# Patient Record
Sex: MALE | Race: ASIAN | NOT HISPANIC OR LATINO | URBAN - METROPOLITAN AREA
[De-identification: names, ages, dates, MRNs, and addresses within clinical notes are randomized per-mention and may not be internally consistent; named-entity substitution may affect disease eponyms.]

---

## 2023-04-18 ENCOUNTER — EMERGENCY (EMERGENCY)
Facility: HOSPITAL | Age: 53
LOS: 1 days | Discharge: HOME | End: 2023-04-18
Attending: EMERGENCY MEDICINE
Payer: MEDICAID

## 2023-04-18 VITALS
WEIGHT: 134.92 LBS | RESPIRATION RATE: 18 BRPM | HEIGHT: 66 IN | HEART RATE: 90 BPM | OXYGEN SATURATION: 97 % | DIASTOLIC BLOOD PRESSURE: 100 MMHG | SYSTOLIC BLOOD PRESSURE: 171 MMHG | TEMPERATURE: 98 F

## 2023-04-18 PROCEDURE — 99284 EMERGENCY DEPT VISIT MOD MDM: CPT

## 2023-04-18 NOTE — ED ADULT NURSE NOTE - OBJECTIVE STATEMENT
Pt is 52Y m, pmhx DM2, HTN, c/o left thumb pain and swelling for 1 week. Pt states he had splinter in left thumb 25 years ago, pt states he is not aware of splinter ever being removed, pt states left thumb has swelled like this before, required drainage and abx, pt presents to ED with left thumb swollen, tender, and warm to the touch, pt denies any numbness or tingling, pt able to move hand freely, pt denies recent injury to thumb, pt is A&Ox3, ambulatory, pt denies all other symptoms, pt updated on plan of care

## 2023-04-18 NOTE — ED PROVIDER NOTE - CPE EDP EYES NORM
Detail Level: Detailed Quality 431: Preventive Care And Screening: Unhealthy Alcohol Use - Screening: Patient screened for unhealthy alcohol use using a single question and scores less than 2 times per year normal... Quality 130: Documentation Of Current Medications In The Medical Record: Current Medications Documented Quality 226: Preventive Care And Screening: Tobacco Use: Screening And Cessation Intervention: Patient screened for tobacco use and is an ex/non-smoker

## 2023-04-18 NOTE — ED PROVIDER NOTE - PATIENT PORTAL LINK FT
You can access the FollowMyHealth Patient Portal offered by [facility] by registering at the following website: [email or web address]. By joining [employer]’s FollowMyHealth portal, you will also be able to view your health information using other applications (apps) compatible with our system.

## 2023-04-18 NOTE — ED PROVIDER NOTE - PROGRESS NOTE DETAILS
Attending MD Barreto: sp I&D, culture sent, Keflex first dose here, Rx to pharmacy.  Stable for discharge. Follow up instructions given, importance of follow up emphasized, return to ED parameters reviewed and patient verbalized understanding.  All questions answered, all concerns addressed. eJtt Yap MD (PGY-2): Dr. Larios consulted. discussed over phone. I reviewed the case with him and went over the xray while discussing the case with him. Dr. Larios is comfortable w/ ED incising the felon and will follow up w/ the patient. Keflex started per hand surgery recommendations.

## 2023-04-18 NOTE — ED PROVIDER NOTE - NS ED ATTENDING STATEMENT MOD
This was a shared visit with the JOIE. I reviewed and verified the documentation and independently performed the documented:

## 2023-04-18 NOTE — ED PROVIDER NOTE - UPPER EXTREMITY EXAM, LEFT
Pad of left thumb with significant swelling which extends to IP joint of thumb. +tenderness to diffuse pad of thumb w/ very light touch. Area warm to touch, no open wound or drainage. Slight decreased IP flexion of thumb 2/2 swelling. Intact abduction/opposition of thumb. Sensation intact.

## 2023-04-18 NOTE — ED PROVIDER NOTE - NSFOLLOWUPINSTRUCTIONS_ED_ALL_ED_FT
YOU WERE SEEN IN THE ED FOR: pain to your left thumb    WHILE YOU WERE HERE, YOU HAD: an incision and drainage of an abscess of your left thumb (wisam)  YOU WERE PRESCRIBED: Keflex.  You were given the first dose here.  FOLLOW THE INSTRUCTIONS ON THE LABEL/CONTAINER    FOR PAIN, YOU MAY TAKE TYLENOL (ACETAMINOPHEN). FOLLOW THE INSTRUCTIONS ON THE LABEL/CONTAINER.  DO NOT EXCEED 4000MG OF TYLENOL (ACETAMINOPHEN) IN A 24 HOUR PERIOD.    WHILE HERE YOUR BLOOD PRESSURE WAS ELEVATED.  PLEASE FOLLOW UP WITH YOUR PRIVATE PHYSICIAN WITHIN THE NEXT 48 HOURS IN REGARDS TO THIS MATTER.  PLEASE CALL ON 4/19/22 TO DR. CORNEJO'S OFFICE TO SET UP A FOLLOW UP APPOINTMENT. BRING COPIES OF YOUR DISCHARGE INSTRUCTIONS.    RETURN TO THE EMERGENCY DEPARTMENT IF YOU EXPERIENCE ANY NEW/CONCERNING/WORSENING SYMPTOMS SUCH AS BUT NOT LIMITED TO: fevers, chills, increasing pain, redness extending from wound, bleeding that does not stop with holding pressure, decreased range of motion of finger, or any other concerns.

## 2023-04-18 NOTE — ED PROVIDER NOTE - OBJECTIVE STATEMENT
51 yo male PMhx diabetes, HTN presents to the ED complaining of left thumb swelling x1 week.  Reports had splinter in pad of left thumb 25 years ago that was never removed, has always had a "bump" to the pad of the thumb but over the last week has had significant increase in the swelling of the thumb with increasing pain.  Feels the finger is "hot" with associated swelling.  Is unable to touch the area without significant pain.  Denies fever/chills, recent injury, recent trauma, drainage/open wound, numbness/tingling.

## 2023-04-18 NOTE — ED PROVIDER NOTE - CARE PROVIDER_API CALL
Magy Larios (MD)  Plastic Surgery  [address] [place] [address], [address]  [place], [place] [id number]  Phone: [phone]  Fax: [phone]  Follow Up Time: 1-3 Days

## 2023-04-19 VITALS
OXYGEN SATURATION: 100 % | SYSTOLIC BLOOD PRESSURE: 153 MMHG | TEMPERATURE: 98 F | HEART RATE: 92 BPM | RESPIRATION RATE: 17 BRPM | DIASTOLIC BLOOD PRESSURE: 105 MMHG

## 2023-04-19 PROCEDURE — 73140 X-RAY EXAM OF FINGER(S): CPT

## 2023-04-19 PROCEDURE — 87075 CULTR BACTERIA EXCEPT BLOOD: CPT

## 2023-04-19 PROCEDURE — 87070 CULTURE OTHR SPECIMN AEROBIC: CPT

## 2023-04-19 PROCEDURE — 10060 I&D ABSCESS SIMPLE/SINGLE: CPT

## 2023-04-19 PROCEDURE — 73140 X-RAY EXAM OF FINGER(S): CPT | Mod: 26,LT

## 2023-04-19 PROCEDURE — 99283 EMERGENCY DEPT VISIT LOW MDM: CPT | Mod: 25

## 2023-04-19 RX ORDER — CEPHALEXIN 500 MG
1 CAPSULE ORAL
Qty: 14 | Refills: 0
Start: 2023-04-19 | End: 2023-04-25

## 2023-04-19 RX ORDER — CEPHALEXIN 500 MG
500 CAPSULE ORAL ONCE
Refills: 0 | Status: COMPLETED | OUTPATIENT
Start: 2023-04-19 | End: 2023-04-19

## 2023-04-19 RX ADMIN — Medication 500 MILLIGRAM(S): at 01:59

## 2023-04-21 PROBLEM — Z00.00 ENCOUNTER FOR PREVENTIVE HEALTH EXAMINATION: Status: ACTIVE | Noted: 2023-04-21

## 2023-04-24 ENCOUNTER — APPOINTMENT (OUTPATIENT)
Dept: ORTHOPEDIC SURGERY | Facility: CLINIC | Age: 53
End: 2023-04-24
Payer: MEDICAID

## 2023-04-24 PROCEDURE — 10121 INC&RMVL FB SUBQ TISS COMP: CPT

## 2023-04-24 PROCEDURE — 99204 OFFICE O/P NEW MOD 45 MIN: CPT | Mod: 25

## 2023-04-24 PROCEDURE — 10061 I&D ABSCESS COMP/MULTIPLE: CPT

## 2023-04-24 NOTE — ED POST DISCHARGE NOTE - RESULT SUMMARY
4/24/23: I was called by [facility] labs regarding culture of wisam from thumb - mild mold like fungus, sent to mycology. pt DC'ed on abx and referred to hand. as per chart review pt has f/u with ortho/hand today.

## 2023-04-24 NOTE — PROCEDURE
[FreeTextEntry1] : After discussion of treatment options including risks, benefits, possible complications, expectations, and alternatives, verbal consent was obtained for irrigation and debridement of the felon and removal of foreign body of the left thumb. A finger block was administered with 5cc of 1% lidocaine in a sterile fashion. Once appropriate anesthesia was obtained, the finger was prepped and draped in a sterile fashion. The previous incision was reopened A small pocket of pus was encountered.  Cultures were taken at this point. A splinter was removed. A hemostat was used to break up septations and the wound was packed. The area was soaked in betadine and rrigated with a copious amount of saline.  The patient will start three times daily soaks. The patient tolerated the procedure well without complication. Sterile dressing applied.\par

## 2023-04-24 NOTE — PHYSICAL EXAM
[de-identified] : Constitutional: Alert and in no acute distress. \par Psychiatric: Oriented to person, place, and time. Insight and judgement were intact and the affect was normal. \par Cardiovascular: Regular rate assessed through peripheral pulses. \par Pulmonary: Nonlabored breathing on room air. \par Lymphatics: No peripheral lymphadenopathy appreciated.\par \par Musculoskeletal: Left Upper Extremity was seen\par Sealed incision with induration and mild erythema at radial distal thumb\par SILT throughout\par Limited IP flexion secondary to pain\par SILT throughout\par <2s capillary refill [de-identified] : XR L Thumb: 3 views demonstrate no radio-opaque foreign body with decreased swelling from prior

## 2023-04-24 NOTE — ED POST DISCHARGE NOTE - DETAILS
4/24/23:  I called the office of Dr. Ann and spoke to the  who reports the pt is in the office currently.  The  was unable to connect me to a provider but collected the [phone] callback number to forward to a provider. -Paulino Hadley PA-C 4/24/23: I was called back by Dr. Ann who managed the pt in the office today, was able to deliver the splinter, will manage mold like fungus outpatient. -Paulino Hadley PA-C

## 2023-04-24 NOTE — HISTORY OF PRESENT ILLNESS
[FreeTextEntry1] : 52 year-old M presents for evaluation of left thumb.  He reports that initially he got a splinter into the thumb in [place] 25 years prior.  OSH was never able to remove the splinter.  He has been doing more with his hands over the past month and he reports having increasing swelling to the thumb.  Denies new injury or new foreign body.  Went to the ER because of increased pain swelling and erythema.  Here he underwent I&D with negative cultures and no encountered foreign body.  He was placed on cephelexin and referred for follow-up. He has not been soaking at home and the wound has sealed.

## 2023-04-24 NOTE — ASSESSMENT
[FreeTextEntry1] : 52 year-old M s/p removal of foreign body and I&D of a felon\par \par Plan:\par F/u cultures\par Wound check in 1 week\par Three times daily soap water soaks

## 2023-04-26 RX ORDER — AMOXICILLIN AND CLAVULANATE POTASSIUM 500; 125 MG/1; MG/1
500-125 TABLET, FILM COATED ORAL 3 TIMES DAILY
Qty: 21 | Refills: 0 | Status: ACTIVE | COMMUNITY
Start: 2023-04-26 | End: 1900-01-01

## 2023-04-26 RX ORDER — SULFAMETHOXAZOLE AND TRIMETHOPRIM 800; 160 MG/1; MG/1
800-160 TABLET ORAL TWICE DAILY
Qty: 14 | Refills: 0 | Status: ACTIVE | COMMUNITY
Start: 2023-04-26 | End: 1900-01-01

## 2023-05-01 ENCOUNTER — APPOINTMENT (OUTPATIENT)
Dept: ORTHOPEDIC SURGERY | Facility: CLINIC | Age: 53
End: 2023-05-01
Payer: MEDICAID

## 2023-05-01 VITALS
SYSTOLIC BLOOD PRESSURE: 110 MMHG | HEIGHT: 69 IN | WEIGHT: 145 LBS | TEMPERATURE: 97.7 F | BODY MASS INDEX: 21.48 KG/M2 | DIASTOLIC BLOOD PRESSURE: 67 MMHG | HEART RATE: 91 BPM

## 2023-05-01 LAB — BACTERIA WND CULT: ABNORMAL

## 2023-05-01 PROCEDURE — 99213 OFFICE O/P EST LOW 20 MIN: CPT | Mod: 24

## 2023-05-01 RX ORDER — ITRACONAZOLE 100 MG/1
100 CAPSULE, COATED PELLETS ORAL DAILY
Qty: 14 | Refills: 0 | Status: ACTIVE | COMMUNITY
Start: 2023-05-01 | End: 1900-01-01

## 2023-05-01 NOTE — HISTORY OF PRESENT ILLNESS
[FreeTextEntry1] : 52 year-old M presents for evaluation of left thumb. He is doing well on abx.  He understands that cultures from hospital have grown a mold and that I will be prescribing an antifungal.  Otherwise no acute complaints.

## 2023-05-01 NOTE — ASSESSMENT
[FreeTextEntry1] : 52 year-old M s/p removal of foreign body and I&D of a felon\par \par Plan:\par Continue antibiotics\par Start antifungal\par Return to office 1 week for wound check

## 2023-05-01 NOTE — PHYSICAL EXAM
[de-identified] : Constitutional: Alert and in no acute distress. \par Psychiatric: Oriented to person, place, and time. Insight and judgement were intact and the affect was normal. \par Cardiovascular: Regular rate assessed through peripheral pulses. \par Pulmonary: Nonlabored breathing on room air. \par Lymphatics: No peripheral lymphadenopathy appreciated.\par \par Musculoskeletal: Left Upper Extremity was seen\par Prior incision healing without erythema, induration\par SILT throughout\par Full range of motion\par SILT throughout\par <2s capillary refill

## 2023-05-10 ENCOUNTER — APPOINTMENT (OUTPATIENT)
Dept: ORTHOPEDIC SURGERY | Facility: CLINIC | Age: 53
End: 2023-05-10
Payer: MEDICAID

## 2023-05-10 DIAGNOSIS — L03.012 CELLULITIS OF LEFT FINGER: ICD-10-CM

## 2023-05-10 PROCEDURE — 99214 OFFICE O/P EST MOD 30 MIN: CPT

## 2023-05-10 RX ORDER — ITRACONAZOLE 100 MG/1
100 CAPSULE, COATED PELLETS ORAL DAILY
Qty: 14 | Refills: 0 | Status: ACTIVE | COMMUNITY
Start: 2023-05-10 | End: 1900-01-01

## 2023-05-17 ENCOUNTER — APPOINTMENT (OUTPATIENT)
Dept: ORTHOPEDIC SURGERY | Facility: CLINIC | Age: 53
End: 2023-05-17

## 2023-11-10 ENCOUNTER — EMERGENCY (EMERGENCY)
Facility: HOSPITAL | Age: 53
LOS: 1 days | Discharge: HOME | End: 2023-11-10
Attending: STUDENT IN AN ORGANIZED HEALTH CARE EDUCATION/TRAINING PROGRAM | Admitting: STUDENT IN AN ORGANIZED HEALTH CARE EDUCATION/TRAINING PROGRAM
Payer: MEDICAID

## 2023-11-10 VITALS
SYSTOLIC BLOOD PRESSURE: 196 MMHG | RESPIRATION RATE: 18 BRPM | HEART RATE: 84 BPM | OXYGEN SATURATION: 100 % | DIASTOLIC BLOOD PRESSURE: 109 MMHG | TEMPERATURE: 98 F

## 2023-11-10 LAB
A1C WITH ESTIMATED AVERAGE GLUCOSE RESULT: 15.1 % — HIGH (ref 4–5.6)
ALBUMIN SERPL ELPH-MCNC: 4.3 G/DL — SIGNIFICANT CHANGE UP (ref 3.3–5)
ALP SERPL-CCNC: 66 U/L — SIGNIFICANT CHANGE UP (ref 40–120)
ALT FLD-CCNC: 13 U/L — SIGNIFICANT CHANGE UP (ref 4–41)
ANION GAP SERPL CALC-SCNC: 12 MMOL/L — SIGNIFICANT CHANGE UP (ref 7–14)
AST SERPL-CCNC: 11 U/L — SIGNIFICANT CHANGE UP (ref 4–40)
B-OH-BUTYR SERPL-SCNC: <0 MMOL/L — SIGNIFICANT CHANGE UP (ref 0–0.4)
BASE EXCESS BLDV CALC-SCNC: -1.2 MMOL/L — SIGNIFICANT CHANGE UP (ref -2–3)
BASOPHILS # BLD AUTO: 0.03 K/UL — SIGNIFICANT CHANGE UP (ref 0–0.2)
BASOPHILS NFR BLD AUTO: 0.5 % — SIGNIFICANT CHANGE UP (ref 0–2)
BILIRUB SERPL-MCNC: <0.2 MG/DL — SIGNIFICANT CHANGE UP (ref 0.2–1.2)
BLOOD GAS VENOUS COMPREHENSIVE RESULT: SIGNIFICANT CHANGE UP
BUN SERPL-MCNC: 25 MG/DL — HIGH (ref 7–23)
CALCIUM SERPL-MCNC: 9.5 MG/DL — SIGNIFICANT CHANGE UP (ref 8.4–10.5)
CHLORIDE BLDV-SCNC: 96 MMOL/L — SIGNIFICANT CHANGE UP (ref 96–108)
CHLORIDE SERPL-SCNC: 94 MMOL/L — LOW (ref 98–107)
CO2 BLDV-SCNC: 27.3 MMOL/L — HIGH (ref 22–26)
CO2 SERPL-SCNC: 24 MMOL/L — SIGNIFICANT CHANGE UP (ref 22–31)
CREAT SERPL-MCNC: 1.13 MG/DL — SIGNIFICANT CHANGE UP (ref 0.5–1.3)
EGFR: 78 ML/MIN/1.73M2 — SIGNIFICANT CHANGE UP
EOSINOPHIL # BLD AUTO: 0.12 K/UL — SIGNIFICANT CHANGE UP (ref 0–0.5)
EOSINOPHIL NFR BLD AUTO: 2.2 % — SIGNIFICANT CHANGE UP (ref 0–6)
ESTIMATED AVERAGE GLUCOSE: 387 — SIGNIFICANT CHANGE UP
GAS PNL BLDV: 129 MMOL/L — LOW (ref 136–145)
GLUCOSE BLDV-MCNC: 554 MG/DL — CRITICAL HIGH (ref 70–99)
GLUCOSE SERPL-MCNC: 521 MG/DL — CRITICAL HIGH (ref 70–99)
HCO3 BLDV-SCNC: 26 MMOL/L — SIGNIFICANT CHANGE UP (ref 22–29)
HCT VFR BLD CALC: 38.4 % — LOW (ref 39–50)
HCT VFR BLDA CALC: 39 % — SIGNIFICANT CHANGE UP (ref 39–51)
HGB BLD CALC-MCNC: 13.1 G/DL — SIGNIFICANT CHANGE UP (ref 12.6–17.4)
HGB BLD-MCNC: 12.9 G/DL — LOW (ref 13–17)
IANC: 2.25 K/UL — SIGNIFICANT CHANGE UP (ref 1.8–7.4)
IMM GRANULOCYTES NFR BLD AUTO: 0.2 % — SIGNIFICANT CHANGE UP (ref 0–0.9)
LACTATE BLDV-MCNC: 1.3 MMOL/L — SIGNIFICANT CHANGE UP (ref 0.5–2)
LIDOCAIN IGE QN: 94 U/L — HIGH (ref 7–60)
LYMPHOCYTES # BLD AUTO: 2.83 K/UL — SIGNIFICANT CHANGE UP (ref 1–3.3)
LYMPHOCYTES # BLD AUTO: 51.2 % — HIGH (ref 13–44)
MAGNESIUM SERPL-MCNC: 2 MG/DL — SIGNIFICANT CHANGE UP (ref 1.6–2.6)
MCHC RBC-ENTMCNC: 28.2 PG — SIGNIFICANT CHANGE UP (ref 27–34)
MCHC RBC-ENTMCNC: 33.6 GM/DL — SIGNIFICANT CHANGE UP (ref 32–36)
MCV RBC AUTO: 83.8 FL — SIGNIFICANT CHANGE UP (ref 80–100)
MONOCYTES # BLD AUTO: 0.29 K/UL — SIGNIFICANT CHANGE UP (ref 0–0.9)
MONOCYTES NFR BLD AUTO: 5.2 % — SIGNIFICANT CHANGE UP (ref 2–14)
NEUTROPHILS # BLD AUTO: 2.25 K/UL — SIGNIFICANT CHANGE UP (ref 1.8–7.4)
NEUTROPHILS NFR BLD AUTO: 40.7 % — LOW (ref 43–77)
NRBC # BLD: 0 /100 WBCS — SIGNIFICANT CHANGE UP (ref 0–0)
NRBC # FLD: 0 K/UL — SIGNIFICANT CHANGE UP (ref 0–0)
PCO2 BLDV: 51 MMHG — SIGNIFICANT CHANGE UP (ref 42–55)
PH BLDV: 7.31 — LOW (ref 7.32–7.43)
PHOSPHATE SERPL-MCNC: 4 MG/DL — SIGNIFICANT CHANGE UP (ref 2.5–4.5)
PLATELET # BLD AUTO: 209 K/UL — SIGNIFICANT CHANGE UP (ref 150–400)
PO2 BLDV: 40 MMHG — SIGNIFICANT CHANGE UP (ref 25–45)
POTASSIUM BLDV-SCNC: 4.7 MMOL/L — SIGNIFICANT CHANGE UP (ref 3.5–5.1)
POTASSIUM SERPL-MCNC: 4.7 MMOL/L — SIGNIFICANT CHANGE UP (ref 3.5–5.3)
POTASSIUM SERPL-SCNC: 4.7 MMOL/L — SIGNIFICANT CHANGE UP (ref 3.5–5.3)
PROT SERPL-MCNC: 7.6 G/DL — SIGNIFICANT CHANGE UP (ref 6–8.3)
RBC # BLD: 4.58 M/UL — SIGNIFICANT CHANGE UP (ref 4.2–5.8)
RBC # FLD: 11.8 % — SIGNIFICANT CHANGE UP (ref 10.3–14.5)
SAO2 % BLDV: 45.6 % — LOW (ref 67–88)
SODIUM SERPL-SCNC: 130 MMOL/L — LOW (ref 135–145)
TSH SERPL-MCNC: 1.82 UIU/ML — SIGNIFICANT CHANGE UP (ref 0.27–4.2)
WBC # BLD: 5.53 K/UL — SIGNIFICANT CHANGE UP (ref 3.8–10.5)
WBC # FLD AUTO: 5.53 K/UL — SIGNIFICANT CHANGE UP (ref 3.8–10.5)

## 2023-11-10 PROCEDURE — 99284 EMERGENCY DEPT VISIT MOD MDM: CPT

## 2023-11-10 PROCEDURE — 71046 X-RAY EXAM CHEST 2 VIEWS: CPT | Mod: 26

## 2023-11-10 PROCEDURE — 93010 ELECTROCARDIOGRAM REPORT: CPT

## 2023-11-10 RX ORDER — SODIUM CHLORIDE 9 MG/ML
1000 INJECTION INTRAMUSCULAR; INTRAVENOUS; SUBCUTANEOUS ONCE
Refills: 0 | Status: COMPLETED | OUTPATIENT
Start: 2023-11-10 | End: 2023-11-10

## 2023-11-10 RX ORDER — AMLODIPINE BESYLATE 2.5 MG/1
5 TABLET ORAL ONCE
Refills: 0 | Status: COMPLETED | OUTPATIENT
Start: 2023-11-10 | End: 2023-11-10

## 2023-11-10 RX ORDER — SODIUM CHLORIDE 9 MG/ML
1000 INJECTION, SOLUTION INTRAVENOUS ONCE
Refills: 0 | Status: COMPLETED | OUTPATIENT
Start: 2023-11-10 | End: 2023-11-10

## 2023-11-10 RX ADMIN — SODIUM CHLORIDE 1000 MILLILITER(S): 9 INJECTION, SOLUTION INTRAVENOUS at 21:53

## 2023-11-10 RX ADMIN — SODIUM CHLORIDE 1000 MILLILITER(S): 9 INJECTION INTRAMUSCULAR; INTRAVENOUS; SUBCUTANEOUS at 22:00

## 2023-11-10 NOTE — ED ADULT TRIAGE NOTE - CODE STROKE ACTIVE YN
No
Patient will benefit from Swallow Therapy pending discharge plan (rehabilitation center vs home care vs outpatient vs [facility] [phone])

## 2023-11-10 NOTE — ED PROVIDER NOTE - WR ORDER NAME 1
Medication refill authorized.
Medication refill request:    Last Office Visit:4/17/18  Next Office Visit:  No future appointments. Pharmacy verified. Yes    Patient need's a new prescription sent to the [facility] there # [phone].
Xray Chest 2 Views PA/Lat

## 2023-11-10 NOTE — ED PROVIDER NOTE - ATTENDING CONTRIBUTION TO CARE
54 yo male with PMH DM and HTN for evaluation of blurry vision. [place] from [facility], does not have primary medical doctor. Was sent to ED by ophthalmology for concerning physical exam PE: Well appearing, RRR, CTA BL lungs, abd soft NTND A/P Labs, imaging, medicate, reassess 52 yo male with PMH DM and HTN for evaluation of blurry vision. [place] from [facility], does not have primary medical doctor. Was sent to ED by ophthalmology for concerning physical exam PE: Well appearing, RRR, CTA BL lungs, abd soft NTND A/P Labs, imaging, medicate, reassess 52 yo male with PMH DM and HTN for evaluation of blurry vision. [place] [facility], does not have primary medical doctor. Was sent to ED by ophthalmology for concerning physical exam PE: Well appearing, RRR, CTA BL lungs, abd soft NTND A/P Labs, imaging, medicate, reassess

## 2023-11-10 NOTE — ED PROVIDER NOTE - PHYSICAL EXAMINATION
General: Alert and Orientated x 3. No apparent distress.  Head: Normocephalic and atraumatic.  Eyes: PERRLA with EOMI.  Neck: Supple. Trachea midline.   Cardiac: Normal S1 and S2 w/ RRR. No murmurs appreciated.   Pulmonary: CTA bilaterally. No increased WOB. No wheezes or crackles.  Abdominal: Soft, non-tender. (+) bowel sounds appreciated in all 4 quadrants. No hepatosplenomegaly.   Neurologic: No focal sensory or motor deficits.  Musculoskeletal: Strength appropriate in all 4 extremities for age with no limited ROM.  Skin: Color appropriate for race. Intact, warm, and well-perfused.  Psychiatric: Appropriate mood and affect. No apparent risk to self or others.

## 2023-11-10 NOTE — ED ADULT NURSE NOTE - NSFALLUNIVINTERV_ED_ALL_ED
Bed/Stretcher in lowest position, wheels locked, appropriate side rails in place/Call bell, personal items and telephone in reach/Instruct patient to call for assistance before getting out of bed/chair/stretcher/Non-slip footwear applied when patient is off stretcher/[place] to call system/Physically safe environment - no spills, clutter or unnecessary equipment/Purposeful proactive rounding/Room/bathroom lighting operational, light cord in reach Bed/Stretcher in lowest position, wheels locked, appropriate side rails in place/Call bell, personal items and telephone in reach/Instruct patient to call for assistance before getting out of bed/chair/stretcher/Non-slip footwear applied when patient is off stretcher/[place] to call system/Physically safe environment - no spills, clutter or unnecessary equipment/Purposeful proactive rounding/Room/bathroom lighting operational, light cord in reach Bed/Stretcher in lowest position, wheels locked, appropriate side rails in place/Call bell, personal items and telephone in reach/Instruct patient to call for assistance before getting out of bed/chair/stretcher/Non-slip footwear applied when patient is off stretcher/[place] to call system/Physically safe environment - no spills, clutter or unnecessary equipment/Purposeful proactive rounding/Room/bathroom lighting operational, light cord in reach

## 2023-11-10 NOTE — ED PROVIDER NOTE - OBJECTIVE STATEMENT
Patient Is a 52 y/o M with hx of DM and HTN PTED with blurry vision. Patient moved from [facility] to the  9 mos ago. Has not taken any oral dm or htn beds. previously on metformin and catapril. Has had micu admissions in [facility] for dka. Never on insulin at home. No PMD f/u here. Went to ophthalmologist and was referred to ED for "blood in eye". Paperwork from physician showed c/f diabetic retinopathy and cotton wool spots. No detachments. Normal pressures. VA 20/60 OD and OS. No fever. chills, nausea, vomiting, urinary or bowel changes. Patient Is a 54 y/o M with hx of DM and HTN PTED with blurry vision. Patient moved from [facility] to the  9 mos ago. Has not taken any oral dm or htn beds. previously on metformin and catapril. Has had micu admissions in [facility] for dka. Never on insulin at home. No PMD f/u here. Went to ophthalmologist and was referred to ED for "blood in eye". Paperwork from physician showed c/f diabetic retinopathy and cotton wool spots. No detachments. Normal pressures. VA 20/60 OD and OS. No fever. chills, nausea, vomiting, urinary or bowel changes. Patient Is a 52 y/o M with hx of DM and HTN PTED with blurry vision. Patient moved from [facility] to the  9 mos ago. Has not taken any oral dm or htn beds. previously on metformin and catapril. Has had micu admissions in [facility] for dka. Never on insulin at home. No PMD f/u here. Went to ophthalmologist and was referred to ED for "blood in eye". Paperwork from physician showed c/f diabetic retinopathy and cotton wool spots. No detachments. Normal pressures. VA 20/60 OD and OS. No fever. chills, nausea, vomiting, urinary or bowel changes.

## 2023-11-10 NOTE — ED ADULT NURSE NOTE - OBJECTIVE STATEMENT
Received patient to 3a for blurry vision. A&o4, ambulatory, pt states noticed BL blurry x 2 weeks, went to eye doctor and was told he is "bleeding behind eyes".  No redness noted. Pt states also has had elevated BP and BGL. Hypertensive at this time, BGL >500. Denies any fall/trauma, chest pain, SOB, headache.  RR even and unlabored, pending MD trevizo

## 2023-11-10 NOTE — ED ADULT TRIAGE NOTE - CHIEF COMPLAINT QUOTE
Sent by ophthalmologist for bleeding behind the eyes. States he's been having blurry vision, headache, dizziness and eye pain. Pt. hypertensive and hyperglycemic (500s) in triage. States he hasn't taken his BP or diabetes medication in 9 months.

## 2023-11-10 NOTE — ED PROVIDER NOTE - CLINICAL SUMMARY MEDICAL DECISION MAKING FREE TEXT BOX
Patient Is a 52 y/o M with hx of DM and HTN PTED with blurry vision. Patient moved from [facility] to the  9 mos ago. Has not taken any oral dm or htn beds. previously on metformin and catapril. Has had micu admissions in [facility] for dka. Never on insulin at home. No PMD f/u here. Went to ophthalmologist and was referred to ED for "blood in eye". Paperwork from physician showed c/f diabetic retinopathy and cotton wool spots. No detachments. Normal pressures. VA 20/60 OD and OS. No fever. chills, nausea, vomiting, urinary or bowel changes.    Slightly hypertensive in ED, otherwise hemodynamically stable. FS ~500s. will get dka labs. patient had full w/u with ophtho and touched base with our ophtho and no additional w/u necessary. if not in dka, will consider cdu for hyperglycemia and endo management. Patient Is a 54 y/o M with hx of DM and HTN PTED with blurry vision. Patient moved from [facility] to the  9 mos ago. Has not taken any oral dm or htn beds. previously on metformin and catapril. Has had micu admissions in [facility] for dka. Never on insulin at home. No PMD f/u here. Went to ophthalmologist and was referred to ED for "blood in eye". Paperwork from physician showed c/f diabetic retinopathy and cotton wool spots. No detachments. Normal pressures. VA 20/60 OD and OS. No fever. chills, nausea, vomiting, urinary or bowel changes.    Slightly hypertensive in ED, otherwise hemodynamically stable. FS ~500s. will get dka labs. patient had full w/u with ophtho and touched base with our ophtho and no additional w/u necessary. if not in dka, will consider cdu for hyperglycemia and endo management. Patient Is a 52 y/o M with hx of DM and HTN PTED with blurry vision. Patient moved from [facility] to the  9 mos ago. Has not taken any oral dm or htn beds. previously on metformin and catapril. Has had micu admissions in [facility] for dka. Never on insulin at home. No PMD f/u here. Went to ophthalmologist and was referred to ED for "blood in eye". Paperwork from physician showed c/f diabetic retinopathy and cotton wool spots. No detachments. Normal pressures. VA 20/60 OD and OS. No fever. chills, nausea, vomiting, urinary or bowel changes.    Slightly hypertensive in ED, otherwise hemodynamically stable. FS ~500s. will get dka labs. patient had full w/u with ophtho and touched base with our ophtho and no additional w/u necessary. if not in dka, will consider cdu for hyperglycemia and endo management.

## 2023-11-11 VITALS
SYSTOLIC BLOOD PRESSURE: 142 MMHG | TEMPERATURE: 98 F | OXYGEN SATURATION: 100 % | RESPIRATION RATE: 16 BRPM | DIASTOLIC BLOOD PRESSURE: 98 MMHG | HEART RATE: 84 BPM

## 2023-11-11 DIAGNOSIS — E11.00 TYPE 2 DIABETES MELLITUS WITH HYPEROSMOLARITY WITHOUT NONKETOTIC HYPERGLYCEMIC-HYPEROSMOLAR COMA (NKHHC): ICD-10-CM

## 2023-11-11 DIAGNOSIS — E78.5 HYPERLIPIDEMIA, UNSPECIFIED: ICD-10-CM

## 2023-11-11 DIAGNOSIS — E11.9 TYPE 2 DIABETES MELLITUS WITHOUT COMPLICATIONS: ICD-10-CM

## 2023-11-11 LAB
ALBUMIN SERPL ELPH-MCNC: 3.7 G/DL — SIGNIFICANT CHANGE UP (ref 3.3–5)
ALP SERPL-CCNC: 59 U/L — SIGNIFICANT CHANGE UP (ref 40–120)
ALT FLD-CCNC: 10 U/L — SIGNIFICANT CHANGE UP (ref 4–41)
ANION GAP SERPL CALC-SCNC: 11 MMOL/L — SIGNIFICANT CHANGE UP (ref 7–14)
APPEARANCE UR: CLEAR — SIGNIFICANT CHANGE UP
AST SERPL-CCNC: 14 U/L — SIGNIFICANT CHANGE UP (ref 4–40)
BASE EXCESS BLDV CALC-SCNC: -0.6 MMOL/L — SIGNIFICANT CHANGE UP (ref -2–3)
BASOPHILS # BLD AUTO: 0.03 K/UL — SIGNIFICANT CHANGE UP (ref 0–0.2)
BASOPHILS NFR BLD AUTO: 0.6 % — SIGNIFICANT CHANGE UP (ref 0–2)
BILIRUB SERPL-MCNC: 0.4 MG/DL — SIGNIFICANT CHANGE UP (ref 0.2–1.2)
BILIRUB UR-MCNC: NEGATIVE — SIGNIFICANT CHANGE UP
BLOOD GAS VENOUS COMPREHENSIVE RESULT: SIGNIFICANT CHANGE UP
BUN SERPL-MCNC: 16 MG/DL — SIGNIFICANT CHANGE UP (ref 7–23)
CALCIUM SERPL-MCNC: 9 MG/DL — SIGNIFICANT CHANGE UP (ref 8.4–10.5)
CHLORIDE BLDV-SCNC: 102 MMOL/L — SIGNIFICANT CHANGE UP (ref 96–108)
CHLORIDE SERPL-SCNC: 100 MMOL/L — SIGNIFICANT CHANGE UP (ref 98–107)
CHOLEST SERPL-MCNC: 150 MG/DL — SIGNIFICANT CHANGE UP
CO2 BLDV-SCNC: 26.2 MMOL/L — HIGH (ref 22–26)
CO2 SERPL-SCNC: 23 MMOL/L — SIGNIFICANT CHANGE UP (ref 22–31)
COLOR SPEC: YELLOW — SIGNIFICANT CHANGE UP
CREAT SERPL-MCNC: 0.96 MG/DL — SIGNIFICANT CHANGE UP (ref 0.5–1.3)
DIFF PNL FLD: NEGATIVE — SIGNIFICANT CHANGE UP
EGFR: 95 ML/MIN/1.73M2 — SIGNIFICANT CHANGE UP
EOSINOPHIL # BLD AUTO: 0.13 K/UL — SIGNIFICANT CHANGE UP (ref 0–0.5)
EOSINOPHIL NFR BLD AUTO: 2.4 % — SIGNIFICANT CHANGE UP (ref 0–6)
GAS PNL BLDV: 132 MMOL/L — LOW (ref 136–145)
GLUCOSE BLDV-MCNC: 297 MG/DL — HIGH (ref 70–99)
GLUCOSE SERPL-MCNC: 278 MG/DL — HIGH (ref 70–99)
GLUCOSE UR QL: >=1000 MG/DL
HCO3 BLDV-SCNC: 25 MMOL/L — SIGNIFICANT CHANGE UP (ref 22–29)
HCT VFR BLD CALC: 39.3 % — SIGNIFICANT CHANGE UP (ref 39–50)
HCT VFR BLDA CALC: 41 % — SIGNIFICANT CHANGE UP (ref 39–51)
HDLC SERPL-MCNC: 22 MG/DL — LOW
HGB BLD CALC-MCNC: 13.5 G/DL — SIGNIFICANT CHANGE UP (ref 12.6–17.4)
HGB BLD-MCNC: 13.2 G/DL — SIGNIFICANT CHANGE UP (ref 13–17)
IANC: 2.18 K/UL — SIGNIFICANT CHANGE UP (ref 1.8–7.4)
IMM GRANULOCYTES NFR BLD AUTO: 0.6 % — SIGNIFICANT CHANGE UP (ref 0–0.9)
KETONES UR-MCNC: NEGATIVE MG/DL — SIGNIFICANT CHANGE UP
LACTATE BLDV-MCNC: 1.3 MMOL/L — SIGNIFICANT CHANGE UP (ref 0.5–2)
LEUKOCYTE ESTERASE UR-ACNC: NEGATIVE — SIGNIFICANT CHANGE UP
LIDOCAIN IGE QN: 73 U/L — HIGH (ref 7–60)
LIPID PNL WITH DIRECT LDL SERPL: 96 MG/DL — SIGNIFICANT CHANGE UP
LYMPHOCYTES # BLD AUTO: 2.71 K/UL — SIGNIFICANT CHANGE UP (ref 1–3.3)
LYMPHOCYTES # BLD AUTO: 50.5 % — HIGH (ref 13–44)
MCHC RBC-ENTMCNC: 28.6 PG — SIGNIFICANT CHANGE UP (ref 27–34)
MCHC RBC-ENTMCNC: 33.6 GM/DL — SIGNIFICANT CHANGE UP (ref 32–36)
MCV RBC AUTO: 85.1 FL — SIGNIFICANT CHANGE UP (ref 80–100)
MONOCYTES # BLD AUTO: 0.29 K/UL — SIGNIFICANT CHANGE UP (ref 0–0.9)
MONOCYTES NFR BLD AUTO: 5.4 % — SIGNIFICANT CHANGE UP (ref 2–14)
NEUTROPHILS # BLD AUTO: 2.18 K/UL — SIGNIFICANT CHANGE UP (ref 1.8–7.4)
NEUTROPHILS NFR BLD AUTO: 40.5 % — LOW (ref 43–77)
NITRITE UR-MCNC: NEGATIVE — SIGNIFICANT CHANGE UP
NON HDL CHOLESTEROL: 128 MG/DL — SIGNIFICANT CHANGE UP
NRBC # BLD: 0 /100 WBCS — SIGNIFICANT CHANGE UP (ref 0–0)
NRBC # FLD: 0 K/UL — SIGNIFICANT CHANGE UP (ref 0–0)
PCO2 BLDV: 43 MMHG — SIGNIFICANT CHANGE UP (ref 42–55)
PH BLDV: 7.37 — SIGNIFICANT CHANGE UP (ref 7.32–7.43)
PH UR: 6 — SIGNIFICANT CHANGE UP (ref 5–8)
PLATELET # BLD AUTO: 201 K/UL — SIGNIFICANT CHANGE UP (ref 150–400)
PO2 BLDV: 67 MMHG — HIGH (ref 25–45)
POTASSIUM BLDV-SCNC: 4.4 MMOL/L — SIGNIFICANT CHANGE UP (ref 3.5–5.1)
POTASSIUM SERPL-MCNC: 4.2 MMOL/L — SIGNIFICANT CHANGE UP (ref 3.5–5.3)
POTASSIUM SERPL-SCNC: 4.2 MMOL/L — SIGNIFICANT CHANGE UP (ref 3.5–5.3)
PROT SERPL-MCNC: 7 G/DL — SIGNIFICANT CHANGE UP (ref 6–8.3)
PROT UR-MCNC: NEGATIVE MG/DL — SIGNIFICANT CHANGE UP
RBC # BLD: 4.62 M/UL — SIGNIFICANT CHANGE UP (ref 4.2–5.8)
RBC # FLD: 11.8 % — SIGNIFICANT CHANGE UP (ref 10.3–14.5)
SAO2 % BLDV: 93.6 % — HIGH (ref 67–88)
SODIUM SERPL-SCNC: 134 MMOL/L — LOW (ref 135–145)
SP GR SPEC: 1.02 — SIGNIFICANT CHANGE UP (ref 1–1.03)
TRIGL SERPL-MCNC: 161 MG/DL — HIGH
UROBILINOGEN FLD QL: 0.2 MG/DL — SIGNIFICANT CHANGE UP (ref 0.2–1)
WBC # BLD: 5.37 K/UL — SIGNIFICANT CHANGE UP (ref 3.8–10.5)
WBC # FLD AUTO: 5.37 K/UL — SIGNIFICANT CHANGE UP (ref 3.8–10.5)

## 2023-11-11 PROCEDURE — 99236 HOSP IP/OBS SAME DATE HI 85: CPT

## 2023-11-11 PROCEDURE — 99283 EMERGENCY DEPT VISIT LOW MDM: CPT

## 2023-11-11 RX ORDER — INSULIN LISPRO 100/ML
4 VIAL (ML) SUBCUTANEOUS
Refills: 0 | Status: DISCONTINUED | OUTPATIENT
Start: 2023-11-11 | End: 2023-11-14

## 2023-11-11 RX ORDER — DEXTROSE 50 % IN WATER 50 %
12.5 SYRINGE (ML) INTRAVENOUS ONCE
Refills: 0 | Status: DISCONTINUED | OUTPATIENT
Start: 2023-11-11 | End: 2023-11-14

## 2023-11-11 RX ORDER — INSULIN LISPRO 100/ML
4 VIAL (ML) SUBCUTANEOUS
Qty: 1 | Refills: 0
Start: 2023-11-11 | End: 2023-12-10

## 2023-11-11 RX ORDER — SODIUM CHLORIDE 9 MG/ML
1000 INJECTION, SOLUTION INTRAVENOUS
Refills: 0 | Status: DISCONTINUED | OUTPATIENT
Start: 2023-11-11 | End: 2023-11-14

## 2023-11-11 RX ORDER — INSULIN GLARGINE 100 [IU]/ML
14 INJECTION, SOLUTION SUBCUTANEOUS
Qty: 1 | Refills: 0
Start: 2023-11-11

## 2023-11-11 RX ORDER — INSULIN LISPRO 100/ML
VIAL (ML) SUBCUTANEOUS
Refills: 0 | Status: DISCONTINUED | OUTPATIENT
Start: 2023-11-11 | End: 2023-11-14

## 2023-11-11 RX ORDER — DEXTROSE 50 % IN WATER 50 %
25 SYRINGE (ML) INTRAVENOUS ONCE
Refills: 0 | Status: DISCONTINUED | OUTPATIENT
Start: 2023-11-11 | End: 2023-11-14

## 2023-11-11 RX ORDER — INSULIN GLARGINE 100 [IU]/ML
14 INJECTION, SOLUTION SUBCUTANEOUS ONCE
Refills: 0 | Status: COMPLETED | OUTPATIENT
Start: 2023-11-11 | End: 2023-11-11

## 2023-11-11 RX ORDER — INSULIN LISPRO 100/ML
VIAL (ML) SUBCUTANEOUS AT BEDTIME
Refills: 0 | Status: DISCONTINUED | OUTPATIENT
Start: 2023-11-11 | End: 2023-11-14

## 2023-11-11 RX ORDER — AMLODIPINE BESYLATE 2.5 MG/1
1 TABLET ORAL
Qty: 30 | Refills: 0
Start: 2023-11-11 | End: 2023-12-10

## 2023-11-11 RX ORDER — METFORMIN HYDROCHLORIDE 850 MG/1
1 TABLET ORAL
Qty: 60 | Refills: 0
Start: 2023-11-11

## 2023-11-11 RX ORDER — DEXTROSE 50 % IN WATER 50 %
15 SYRINGE (ML) INTRAVENOUS ONCE
Refills: 0 | Status: DISCONTINUED | OUTPATIENT
Start: 2023-11-11 | End: 2023-11-14

## 2023-11-11 RX ORDER — GLUCAGON INJECTION, SOLUTION 0.5 MG/.1ML
1 INJECTION, SOLUTION SUBCUTANEOUS ONCE
Refills: 0 | Status: DISCONTINUED | OUTPATIENT
Start: 2023-11-11 | End: 2023-11-14

## 2023-11-11 RX ADMIN — Medication 4 UNIT(S): at 16:47

## 2023-11-11 RX ADMIN — Medication 4 UNIT(S): at 11:38

## 2023-11-11 RX ADMIN — AMLODIPINE BESYLATE 5 MILLIGRAM(S): 2.5 TABLET ORAL at 00:38

## 2023-11-11 RX ADMIN — Medication 1: at 16:47

## 2023-11-11 RX ADMIN — Medication 2: at 07:48

## 2023-11-11 RX ADMIN — SODIUM CHLORIDE 1000 MILLILITER(S): 9 INJECTION, SOLUTION INTRAVENOUS at 00:35

## 2023-11-11 RX ADMIN — INSULIN GLARGINE 14 UNIT(S): 100 INJECTION, SOLUTION SUBCUTANEOUS at 03:38

## 2023-11-11 RX ADMIN — Medication 2: at 11:39

## 2023-11-11 RX ADMIN — Medication 4 UNIT(S): at 07:47

## 2023-11-11 NOTE — ED CDU PROVIDER DISPOSITION NOTE - CLINICAL COURSE
54 y/o M with hx of DM and HTN PTED with blurry vision. Patient moved from [facility] to the  9 mos ago. Has not taken any oral dm or htn beds. previously on metformin and catapril. Has had micu admissions in [facility] for dka. Never on insulin at home. No PMD f/u here. Went to ophthalmologist and was referred to ED for "blood in eye". Paperwork from physician showed c/f diabetic retinopathy and cotton wool spots. No detachments. Normal pressures. VA 20/60 OD and OS. No fever. chills, nausea, vomiting, urinary or bowel changes.    	CDU BLACK Arciniega Note----  	ED Provider HPI as above, reviewed.  Pt is a 54 yo male, PMH HTN, DM, with past hx/o DKA in [facility], but was reportedly never Rx'd insulin, was only on metformin.  Since moving from [facility] ~9 months ago, pt has not been on any medication; has not arranged a PMD since moving here.  Pt went to an outside ophthalmologist for c/o blurry vision, had findings c/w diabetic retinopathy, as above.  In the ED, pt afebrile, Initial /109; VS otherwise stable.  BP downtrended to 172/109, then 141/89; amlodipine 5 milligrams dose was then ordered in ED.  WBC 5.53, Hb 12.9, Hct 38.4, platelets 209.  CMP: sodium 130, anion gap 12, BUN 25, creatinine 1.13, glucose 521.  Lipase 94.  A1c 15.1, beta hydroxy butyrate <0.0.  VBG: pH 7.31, lactate 1.3.  TSH 1.82.  CXR NAPF. Pt was given 3 liters IV fluids; pt was sent to CDU for continued care plan:  Hyperglycemia management, glucose monitoring, AM labs, Endocrine consultation, diabetic education, supportive care, general observation care / monitoring. Endo team consulted patient. Recs appreciated. Rx's sent to pharmacy. DM education provided. Patient to follow up in Endo clinic. Worsening, continued or ANY new concerning symptoms return to the emergency department. 54 y/o M with hx of DM and HTN PTED with blurry vision. Patient moved from [facility] to the  9 mos ago. Has not taken any oral dm or htn beds. previously on metformin and catapril. Has had micu admissions in [facility] for dka. Never on insulin at home. No PMD f/u here. Went to ophthalmologist and was referred to ED for "blood in eye". Paperwork from physician showed c/f diabetic retinopathy and cotton wool spots. No detachments. Normal pressures. VA 20/60 OD and OS. No fever. chills, nausea, vomiting, urinary or bowel changes.    	CDU BLACK Arciniega Note----  	ED Provider HPI as above, reviewed.  Pt is a 54 yo male, PMH HTN, DM, with past hx/o DKA in [facility], but was reportedly never Rx'd insulin, was only on metformin.  Since moving from [facility] ~9 months ago, pt has not been on any medication; has not arranged a PMD since moving here.  Pt went to an outside ophthalmologist for c/o blurry vision, had findings c/w diabetic retinopathy, as above.  In the ED, pt afebrile, Initial /109; VS otherwise stable.  BP downtrended to 172/109, then 141/89; amlodipine 5 milligrams dose was then ordered in ED.  WBC 5.53, Hb 12.9, Hct 38.4, platelets 209.  CMP: sodium 130, anion gap 12, BUN 25, creatinine 1.13, glucose 521.  Lipase 94.  A1c 15.1, beta hydroxy butyrate <0.0.  VBG: pH 7.31, lactate 1.3.  TSH 1.82.  CXR NAPF. Pt was given 3 liters IV fluids; pt was sent to CDU for continued care plan:  Hyperglycemia management, glucose monitoring, AM labs, Endocrine consultation, diabetic education, supportive care, general observation care / monitoring. Endo team consulted patient. Recs appreciated. Rx's sent to pharmacy. DM education provided. Patient to follow up in Endo clinic. Worsening, continued or ANY new concerning symptoms return to the emergency department. 52 y/o M with hx of DM and HTN PTED with blurry vision. Patient moved from [facility] to the  9 mos ago. Has not taken any oral dm or htn beds. previously on metformin and catapril. Has had micu admissions in [facility] for dka. Never on insulin at home. No PMD f/u here. Went to ophthalmologist and was referred to ED for "blood in eye". Paperwork from physician showed c/f diabetic retinopathy and cotton wool spots. No detachments. Normal pressures. VA 20/60 OD and OS. No fever. chills, nausea, vomiting, urinary or bowel changes.    	CDU BLACK Arciniega Note----  	ED Provider HPI as above, reviewed.  Pt is a 52 yo male, PMH HTN, DM, with past hx/o DKA in [facility], but was reportedly never Rx'd insulin, was only on metformin.  Since moving from [facility] ~9 months ago, pt has not been on any medication; has not arranged a PMD since moving here.  Pt went to an outside ophthalmologist for c/o blurry vision, had findings c/w diabetic retinopathy, as above.  In the ED, pt afebrile, Initial /109; VS otherwise stable.  BP downtrended to 172/109, then 141/89; amlodipine 5 milligrams dose was then ordered in ED.  WBC 5.53, Hb 12.9, Hct 38.4, platelets 209.  CMP: sodium 130, anion gap 12, BUN 25, creatinine 1.13, glucose 521.  Lipase 94.  A1c 15.1, beta hydroxy butyrate <0.0.  VBG: pH 7.31, lactate 1.3.  TSH 1.82.  CXR NAPF. Pt was given 3 liters IV fluids; pt was sent to CDU for continued care plan:  Hyperglycemia management, glucose monitoring, AM labs, Endocrine consultation, diabetic education, supportive care, general observation care / monitoring. Endo team consulted patient. Recs appreciated. Rx's sent to pharmacy. DM education provided. Patient to follow up in Endo clinic. Worsening, continued or ANY new concerning symptoms return to the emergency department.

## 2023-11-11 NOTE — ED CDU PROVIDER DISPOSITION NOTE - NSFOLLOWUPINSTRUCTIONS_ED_ALL_ED_FT
Follow up in our Endo clinic within the week. Call to make an appointment:   [facility]  [address] [place], [place] [address] [phone]    High Blood Pressure:  Start Amlodipine 5mg 1 tab daily    Diabetes:  Test your blood sugar 3x/day before meals and at bedtime and record in a log book- if less than 50 or greater than 300 return to the ER  Take Metformin 500mg 1 tab 2x/day for 7 days then increase to 1000mg 2x/day  Inject Lantus 14 units at bedtime  Inject Admelog 4 units before meals 3 times per day  Decrease your sugar and carbohydrate intake in your diet  Increase your exercise     Worsening, continued or ANY new concerning symptoms return to the emergency department. Follow up in our Endo clinic within the week. Call to make an appointment:   [facility]  [address] [place], [place] [address] [phone]    High Blood Pressure:  Start Amlodipine 5mg 1 tab daily    Diabetes:  Test your blood sugar 3x/day before meals and at bedtime and record in a log book- if less than 50 or greater than 300 return to the ER  Take Metformin 500mg 1 tab 2x/day for 7 days then increase to 1000mg 2x/day  Inject Lantus 14 units at bedtime  Inject Admelog 4 units before meals 3 times per day  Decrease your sugar and carbohydrate intake in your diet  Increase your exercise     Worsening, continued or ANY new concerning symptoms return to the emergency department. Follow up in our Endo clinic within the week. Call to make an appointment:   [facility]  [address] [place], [place] [id number] [phone]    High Blood Pressure:  Start Amlodipine 5mg 1 tab daily    Diabetes:  Test your blood sugar 3x/day before meals and at bedtime and record in a log book- if less than 50 or greater than 300 return to the ER  Take Metformin 500mg 1 tab 2x/day for 7 days then increase to 1000mg 2x/day  Inject Lantus 14 units at bedtime  Inject Admelog 4 units before meals 3 times per day  Decrease your sugar and carbohydrate intake in your diet  Increase your exercise     Worsening, continued or ANY new concerning symptoms return to the emergency department.

## 2023-11-11 NOTE — ED CDU PROVIDER DISPOSITION NOTE - ATTENDING CONTRIBUTION TO CARE
I have evaluated the patient and agree with the documentation and assessment as made by the JOIE. We have discussed plan of care and work up for the patient.

## 2023-11-11 NOTE — CONSULT NOTE ADULT - ASSESSMENT
52 yo male w/ PMH HTN, T2DM, [place] from [facility] ~9 months ago. Presented to ED after seeing opthalmology for blurry vision, had findings c/w diabetic retinopathy, was under impression that he might have had "bleeding in his eye" and came to the ED for this reason. Patient found to have hyperglycemic with BG to 500s and A1c 15.1%.     Consulted for: Hyperglycemia, uncontrolled T2DM    #HHS  #Uncontrolled T2DM  Dx with T2DM 3-4 years ago in [facility].  Has been on metformin 500mg BID since. Has not been on medications for last 9 months after moving from [facility]. Did not establish care with PCP and renew medications.  He reports hx of hospitalization for hyperglycemia when he was given SC insulin during hospitalization. Denies hx of DKA requiring insulin gtt.  Has DM retinopathy. Occasional neuropathy. Denies CAD, CVA, PAD, kidney disease.    Hyperglycemic up to . No evidence of DKA. A1c 15.1%  Started on weight based basal/bolus insulin regimen    Recommendations:  - Lantus   - Admelog  - For discharge: Basal/bolus insulin *** + metformin + Jardiance. No GLP1 agonist iso recent DM retinopathy.  --Pt will need RX for basal insulin pen (ie. Basaglar, Lantus, Tresiba, Toujeo, Levemir) and bolus insulin pen(ie. humalog/novolog/admelog) depending on insurance coverage; please send test scripts to see which is covered.   --Please send prescriptions for diabetes supplies (glucometer, test strips, lancets, alcohol swabs, insulin pen needles).  --Routine outpatient opthalmology & podiatry evaluations recommended. Patient can follow up with endocrinology at the location provided below:  [address] [place] [address] endocrinology ([phone]) 54 yo male w/ PMH HTN, T2DM, [place] from [facility] ~9 months ago. Presented to ED after seeing opthalmology for blurry vision, had findings c/w diabetic retinopathy, was under impression that he might have had "bleeding in his eye" and came to the ED for this reason. Patient found to have hyperglycemic with BG to 500s and A1c 15.1%.     Consulted for: Hyperglycemia, uncontrolled T2DM    #HHS  #Uncontrolled T2DM  Dx with T2DM 3-4 years ago in [facility].  Has been on metformin 500mg BID since. Has not been on medications for last 9 months after moving from [facility]. Did not establish care with PCP and renew medications.  He reports hx of hospitalization for hyperglycemia when he was given SC insulin during hospitalization. Denies hx of DKA requiring insulin gtt.  Has DM retinopathy. Occasional neuropathy. Denies CAD, CVA, PAD, kidney disease.    Hyperglycemic up to . No evidence of DKA. A1c 15.1%  Started on weight based basal/bolus insulin regimen    Recommendations:  - Lantus   - Admelog  - For discharge: Basal/bolus insulin *** + metformin + Jardiance. No GLP1 agonist iso recent DM retinopathy.  --Pt will need RX for basal insulin pen (ie. Basaglar, Lantus, Tresiba, Toujeo, Levemir) and bolus insulin pen(ie. humalog/novolog/admelog) depending on insurance coverage; please send test scripts to see which is covered.   --Please send prescriptions for diabetes supplies (glucometer, test strips, lancets, alcohol swabs, insulin pen needles).  --Routine outpatient opthalmology & podiatry evaluations recommended. Patient can follow up with endocrinology at the location provided below:  [address] [place] [address] endocrinology ([phone]) 54 yo male w/ PMH HTN, T2DM, [place] from [facility] ~9 months ago. Presented to ED after seeing opthalmology for blurry vision, had findings c/w diabetic retinopathy, was under impression that he might have had "bleeding in his eye" and came to the ED for this reason. Patient found to have hyperglycemic with BG to 500s and A1c 15.1%.     Consulted for: Hyperglycemia, uncontrolled T2DM    #HHS  #Uncontrolled T2DM  Dx with T2DM 3-4 years ago in [facility].  Has been on metformin 500mg BID since. Has not been on medications for last 9 months after moving from [facility]. Did not establish care with PCP and renew medications.  He reports hx of hospitalization for hyperglycemia when he was given SC insulin during hospitalization. Denies hx of DKA requiring insulin gtt.  Has DM retinopathy. Occasional neuropathy. Denies CAD, CVA, PAD, kidney disease.    Hyperglycemic up to . No evidence of DKA. A1c 15.1%  Started on weight based basal/bolus insulin regimen    Recommendations:  - Lantus   - Admelog  - For discharge: Basal/bolus insulin *** + metformin + Jardiance. No GLP1 agonist iso recent DM retinopathy.  --Pt will need RX for basal insulin pen (ie. Basaglar, Lantus, Tresiba, Toujeo, Levemir) and bolus insulin pen(ie. humalog/novolog/admelog) depending on insurance coverage; please send test scripts to see which is covered.   --Please send prescriptions for diabetes supplies (glucometer, test strips, lancets, alcohol swabs, insulin pen needles).  --Routine outpatient opthalmology & podiatry evaluations recommended. Patient can follow up with endocrinology at the location provided below:  [address] [place] [address] endocrinology ([phone]) 54 yo male w/ PMH HTN, T2DM, [place] [facility] ~9 months ago. Presented to ED after seeing opthalmology for blurry vision, had findings c/w diabetic retinopathy, was under impression that he might have had "bleeding in his eye" and came to the ED for this reason. Patient found to have hyperglycemic with BG to 500s and A1c 15.1%.     Consulted for: Hyperglycemia, uncontrolled T2DM    #HHS  #Uncontrolled T2DM  Dx with T2DM 3-4 years ago in [facility].  Has been on metformin 500mg BID since. Has not been on medications for last 9 months after moving from [facility]. Did not establish care with PCP and renew medications.  He reports hx of hospitalization for hyperglycemia when he was given SC insulin during hospitalization. Denies hx of DKA requiring insulin gtt.  Has DM retinopathy. Occasional neuropathy. Denies CAD, CVA, PAD, kidney disease.    Hyperglycemic up to . No evidence of DKA. A1c 15.1%  Started on weight based basal/bolus insulin regimen    Recommendations:  - Discharge regimen: Lantus 14 units QHS and Admelog 4 units TIDAC + metformin 500mg BID x 7 days, then increase to 1000mg BID. Can consider Jardiance outpatient. No GLP1 agonist iso recent DM retinopathy.  --Pt will need RX for basal insulin pen (ie. Basaglar, Lantus, Tresiba, Toujeo, Levemir) and bolus insulin pen(ie. humalog/novolog/admelog) depending on insurance coverage; please send test scripts to see which is covered.   --Please send prescriptions for diabetes supplies (glucometer, test strips, lancets, alcohol swabs, insulin pen needles).  --Routine outpatient opthalmology & podiatry evaluations recommended. Patient can follow up with endocrinology at the location provided below:  [address] [place] [address] endocrinology ([phone])    #HLD  - start moderate dose atorvastatin 20mg QHS  - needs Lipid profile as outpatient    Farhad López MD  Endocrine Fellow  Can be reached via teams. For follow up questions, discharge recommendations, or new consults, please call answering service at [phone] (weekdays); [phone] (nights/weekends). 52 yo male w/ PMH HTN, T2DM, [place] [facility] ~9 months ago. Presented to ED after seeing opthalmology for blurry vision, had findings c/w diabetic retinopathy, was under impression that he might have had "bleeding in his eye" and came to the ED for this reason. Patient found to have hyperglycemic with BG to 500s and A1c 15.1%.     Consulted for: Hyperglycemia, uncontrolled T2DM    #HHS  #Uncontrolled T2DM  Dx with T2DM 3-4 years ago in [facility].  Has been on metformin 500mg BID since. Has not been on medications for last 9 months after moving from [facility]. Did not establish care with PCP and renew medications.  He reports hx of hospitalization for hyperglycemia when he was given SC insulin during hospitalization. Denies hx of DKA requiring insulin gtt.  Has DM retinopathy. Occasional neuropathy. Denies CAD, CVA, PAD, kidney disease.    Hyperglycemic up to . No evidence of DKA. A1c 15.1%  Started on weight based basal/bolus insulin regimen    Recommendations:  - Discharge regimen: Lantus 14 units QHS and Admelog 4 units TIDAC + metformin 500mg BID x 7 days, then increase to 1000mg BID. Can consider Jardiance outpatient. No GLP1 agonist iso recent DM retinopathy.  --Pt will need RX for basal insulin pen (ie. Basaglar, Lantus, Tresiba, Toujeo, Levemir) and bolus insulin pen(ie. humalog/novolog/admelog) depending on insurance coverage; please send test scripts to see which is covered.   --Please send prescriptions for diabetes supplies (glucometer, test strips, lancets, alcohol swabs, insulin pen needles).  --Routine outpatient opthalmology & podiatry evaluations recommended. Patient can follow up with endocrinology at the location provided below:  [address] [place] [address] endocrinology ([phone])    #HLD  - start moderate dose atorvastatin 20mg QHS  - needs Lipid profile as outpatient    Farhad López MD  Endocrine Fellow  Can be reached via teams. For follow up questions, discharge recommendations, or new consults, please call answering service at [phone] (weekdays); [phone] (nights/weekends). 54 yo male w/ PMH HTN, T2DM, [place] [facility] ~9 months ago. Presented to ED after seeing opthalmology for blurry vision, had findings c/w diabetic retinopathy, was under impression that he might have had "bleeding in his eye" and came to the ED for this reason. Patient found to have hyperglycemic with BG to 500s and A1c 15.1%.     Consulted for: Hyperglycemia, uncontrolled T2DM    #HHS  #Uncontrolled T2DM  Dx with T2DM 3-4 years ago in [facility].  Has been on metformin 500mg BID since. Has not been on medications for last 9 months after moving from [facility]. Did not establish care with PCP and renew medications.  He reports hx of hospitalization for hyperglycemia when he was given SC insulin during hospitalization. Denies hx of DKA requiring insulin gtt.  Has DM retinopathy. Occasional neuropathy. Denies CAD, CVA, PAD, kidney disease.    Hyperglycemic up to . No evidence of DKA. A1c 15.1%  Started on weight based basal/bolus insulin regimen    Recommendations:  - Discharge regimen: Lantus 14 units QHS and Admelog 4 units TIDAC + metformin 500mg BID x 7 days, then increase to 1000mg BID. Can consider Jardiance outpatient. No GLP1 agonist iso recent DM retinopathy.  --Pt will need RX for basal insulin pen (ie. Basaglar, Lantus, Tresiba, Toujeo, Levemir) and bolus insulin pen(ie. humalog/novolog/admelog) depending on insurance coverage; please send test scripts to see which is covered.   --Please send prescriptions for diabetes supplies (glucometer, test strips, lancets, alcohol swabs, insulin pen needles).  --Routine outpatient opthalmology & podiatry evaluations recommended. Patient can follow up with endocrinology at the location provided below:  [address] [place] [address] endocrinology ([phone])    #HLD  - start moderate dose atorvastatin 20mg QHS  - needs Lipid profile as outpatient    Farhad López MD  Endocrine Fellow  Can be reached via teams. For follow up questions, discharge recommendations, or new consults, please call answering service at [phone] (weekdays); [phone] (nights/weekends).

## 2023-11-11 NOTE — CONSULT NOTE ADULT - SUBJECTIVE AND OBJECTIVE BOX
NOTE INCOMPLETE/ IN PROGRESS  *Please wait for attending attestation for official recommendations.     HPI:  52 yo male w/ PMH HTN, T2DM, recently moved [place] from [facility] ~9 months ago. Since moving from [facility] ~9 months ago, pt has not been on any medication; has not arranged a PMD since moving here.  Pt went to an outside ophthalmologist for c/o blurry vision, had findings c/w diabetic retinopathy, was under impression that he might have had "bleeding in his eye" and came to the ED for this reason. In the ED, outpatient ophtho report was reviewed which showed findings c/w diabetic retinopathy. Patient found to have hyperglycemic with BG to 500s and A1c 15.1%.     Consulted for: Hyperglycemia, uncontrolled T2DM    Diabetes history:  Dx with T2DM 3-4 years ago in [facility].  Has been on metformin 500mg BID since. Has not been on medications for last 9 months after moving from [facility]. Did not establish care with PCP and renew medications.  He reports hx of hospitalization for hyperglycemia when he was given SC insulin during hospitalization. Denies hx of DKA requiring insulin gtt.  Has DM retinopathy. Occasional neuropathy. Denies CAD, CVA, PAD, kidney disease.  Reports polyuria, polydipsia  SMBG: does not neck. Has glucometer.    Family hx of mother with DM  No smoking no alcohol    PAST MEDICAL & SURGICAL HISTORY:  Diabetes  (hx/o DKA in [facility])      HTN (hypertension)      No significant past surgical history          Outpatient Medications:  metformin 500mg BID    MEDICATIONS  (STANDING):  dextrose 5%. 1000 milliLiter(s) (100 mL/Hr) IV Continuous <Continuous>  dextrose 5%. 1000 milliLiter(s) (50 mL/Hr) IV Continuous <Continuous>  dextrose 50% Injectable 25 Gram(s) IV Push once  dextrose 50% Injectable 25 Gram(s) IV Push once  dextrose 50% Injectable 12.5 Gram(s) IV Push once  glucagon  Injectable 1 milliGRAM(s) IntraMuscular once  insulin lispro (ADMELOG) corrective regimen sliding scale   SubCutaneous at bedtime  insulin lispro (ADMELOG) corrective regimen sliding scale   SubCutaneous three times a day before meals  insulin lispro Injectable (ADMELOG) 4 Unit(s) SubCutaneous three times a day before meals    MEDICATIONS  (PRN):  dextrose Oral Gel 15 Gram(s) Oral once PRN Blood Glucose LESS THAN 70 milliGRAM(s)/deciliter      Allergies    No Known Allergies    Intolerances      Review of Systems:  Constitutional: No fever  Eyes: No blurry vision  Neuro: No tremors  HEENT: No pain  Cardiovascular: No chest pain, palpitations  Respiratory: No SOB, no cough  GI: No nausea, vomiting, abdominal pain  : No dysuria  Skin: no rash  Psych: no depression  Endocrine: no polyuria, polydipsia  Hem/lymph: no swelling  Osteoporosis: no fractures    ALL OTHER SYSTEMS REVIEWED AND NEGATIVE    UNABLE TO OBTAIN    PHYSICAL EXAM:  VITALS: T(C): 36.8 (11-11-23 @ 13:34)  T(F): 98.2 (11-11-23 @ 13:34), Max: 98.2 (11-10-23 @ 19:19)  HR: 68 (11-11-23 @ 13:34) (63 - 84)  BP: 129/87 (11-11-23 @ 13:34) (129/87 - 196/109)  RR:  (16 - 18)  SpO2:  (100% - 100%)  Wt(kg): --  GENERAL: NAD, well-groomed, well-developed  EYES: No proptosis, no lid lag, anicteric  HEENT:  Atraumatic, Normocephalic, moist mucous membranes  THYROID: Normal size, no palpable nodules  RESPIRATORY: Clear to auscultation bilaterally; No rales, rhonchi, wheezing  CARDIOVASCULAR: Regular rate and rhythm; No murmurs; no peripheral edema  GI: Soft, nontender, non distended, normal bowel sounds  SKIN: Dry, intact, No rashes or lesions  MUSCULOSKELETAL: Full range of motion, normal strength  NEURO: sensation intact, extraocular movements intact, no tremor  PSYCH: Alert and oriented x 3, normal affect, normal mood  CUSHING'S SIGNS: no striae      CAPILLARY BLOOD GLUCOSE      POCT Blood Glucose.: 208 mg/dL (11 Nov 2023 11:24)  POCT Blood Glucose.: 241 mg/dL (11 Nov 2023 07:47)  POCT Blood Glucose.: 293 mg/dL (11 Nov 2023 03:00)  POCT Blood Glucose.: 380 mg/dL (11 Nov 2023 00:31)  POCT Blood Glucose.: 379 mg/dL (10 Nov 2023 22:34)  POCT Blood Glucose.: 466 mg/dL (10 Nov 2023 20:55)  POCT Blood Glucose.: 511 mg/dL (10 Nov 2023 19:25)                            13.2   5.37  )-----------( 201      ( 11 Nov 2023 06:53 )             39.3       11-11    134<L>  |  100  |  16  ----------------------------<  278<H>  4.2   |  23  |  0.96    eGFR: 95    Ca    9.0      11-11  Mg     2.00     11-10  Phos  4.0     11-10    TPro  7.0  /  Alb  3.7  /  TBili  0.4  /  DBili  x   /  AST  14  /  ALT  10  /  AlkPhos  59  11-11      Thyroid Function Tests:  11-10 @ 20:57 TSH 1.82 FreeT4 -- T3 -- Anti TPO -- Anti Thyroglobulin Ab -- TSI --      A1C with Estimated Average Glucose Result: 15.1 % (11-10-23 @ 20:57)      11-11 Chol 150 Direct LDL -- LDL calculated 96 HDL 22<L> Trig 161<H>    Radiology:              NOTE INCOMPLETE/ IN PROGRESS  *Please wait for attending attestation for official recommendations.     HPI:  52 yo male w/ PMH HTN, T2DM, recently moved [place] [facility] ~9 months ago. Since moving from [facility] ~9 months ago, pt has not been on any medication; has not arranged a PMD since moving here.  Pt went to an outside ophthalmologist for c/o blurry vision, had findings c/w diabetic retinopathy, was under impression that he might have had "bleeding in his eye" and came to the ED for this reason. In the ED, outpatient ophtho report was reviewed which showed findings c/w diabetic retinopathy. Patient found to have hyperglycemic with BG to 500s and A1c 15.1%.     Consulted for: Hyperglycemia, uncontrolled T2DM    Diabetes history:  Dx with T2DM 3-4 years ago in [facility].  Has been on metformin 500mg BID since. Has not been on medications for last 9 months after moving from [facility]. Did not establish care with PCP and renew medications.  He reports hx of hospitalization for hyperglycemia when he was given SC insulin during hospitalization. Denies hx of DKA requiring insulin gtt.  Has DM retinopathy. Occasional neuropathy. Denies CAD, CVA, PAD, kidney disease.  Reports polyuria, polydipsia  SMBG: does not neck. Has glucometer.    Family hx of mother with DM  No smoking no alcohol    PAST MEDICAL & SURGICAL HISTORY:  Diabetes  (hx/o DKA in [facility])      HTN (hypertension)      No significant past surgical history          Outpatient Medications:  metformin 500mg BID    MEDICATIONS  (STANDING):  dextrose 5%. 1000 milliLiter(s) (100 mL/Hr) IV Continuous <Continuous>  dextrose 5%. 1000 milliLiter(s) (50 mL/Hr) IV Continuous <Continuous>  dextrose 50% Injectable 25 Gram(s) IV Push once  dextrose 50% Injectable 25 Gram(s) IV Push once  dextrose 50% Injectable 12.5 Gram(s) IV Push once  glucagon  Injectable 1 milliGRAM(s) IntraMuscular once  insulin lispro (ADMELOG) corrective regimen sliding scale   SubCutaneous at bedtime  insulin lispro (ADMELOG) corrective regimen sliding scale   SubCutaneous three times a day before meals  insulin lispro Injectable (ADMELOG) 4 Unit(s) SubCutaneous three times a day before meals    MEDICATIONS  (PRN):  dextrose Oral Gel 15 Gram(s) Oral once PRN Blood Glucose LESS THAN 70 milliGRAM(s)/deciliter      Allergies    No Known Allergies    Intolerances      Review of Systems:  Constitutional: No fever  Eyes: No blurry vision  Neuro: No tremors  HEENT: No pain  Cardiovascular: No chest pain, palpitations  Respiratory: No SOB, no cough  GI: No nausea, vomiting, abdominal pain  : No dysuria  Skin: no rash  Psych: no depression  Endocrine: no polyuria, polydipsia  Hem/lymph: no swelling  Osteoporosis: no fractures    ALL OTHER SYSTEMS REVIEWED AND NEGATIVE    UNABLE TO OBTAIN    PHYSICAL EXAM:  VITALS: T(C): 36.8 (11-11-23 @ 13:34)  T(F): 98.2 (11-11-23 @ 13:34), Max: 98.2 (11-10-23 @ 19:19)  HR: 68 (11-11-23 @ 13:34) (63 - 84)  BP: 129/87 (11-11-23 @ 13:34) (129/87 - 196/109)  RR:  (16 - 18)  SpO2:  (100% - 100%)  Wt(kg): --  GENERAL: NAD, well-groomed, well-developed  EYES: No proptosis, no lid lag, anicteric  HEENT:  Atraumatic, Normocephalic, moist mucous membranes  THYROID: Normal size, no palpable nodules  RESPIRATORY: Clear to auscultation bilaterally; No rales, rhonchi, wheezing  CARDIOVASCULAR: Regular rate and rhythm; No murmurs; no peripheral edema  GI: Soft, nontender, non distended, normal bowel sounds  SKIN: Dry, intact, No rashes or lesions  MUSCULOSKELETAL: Full range of motion, normal strength  NEURO: sensation intact, extraocular movements intact, no tremor  PSYCH: Alert and oriented x 3, normal affect, normal mood  CUSHING'S SIGNS: no striae      CAPILLARY BLOOD GLUCOSE      POCT Blood Glucose.: 208 mg/dL (11 Nov 2023 11:24)  POCT Blood Glucose.: 241 mg/dL (11 Nov 2023 07:47)  POCT Blood Glucose.: 293 mg/dL (11 Nov 2023 03:00)  POCT Blood Glucose.: 380 mg/dL (11 Nov 2023 00:31)  POCT Blood Glucose.: 379 mg/dL (10 Nov 2023 22:34)  POCT Blood Glucose.: 466 mg/dL (10 Nov 2023 20:55)  POCT Blood Glucose.: 511 mg/dL (10 Nov 2023 19:25)                            13.2   5.37  )-----------( 201      ( 11 Nov 2023 06:53 )             39.3       11-11    134<L>  |  100  |  16  ----------------------------<  278<H>  4.2   |  23  |  0.96    eGFR: 95    Ca    9.0      11-11  Mg     2.00     11-10  Phos  4.0     11-10    TPro  7.0  /  Alb  3.7  /  TBili  0.4  /  DBili  x   /  AST  14  /  ALT  10  /  AlkPhos  59  11-11      Thyroid Function Tests:  11-10 @ 20:57 TSH 1.82 FreeT4 -- T3 -- Anti TPO -- Anti Thyroglobulin Ab -- TSI --      A1C with Estimated Average Glucose Result: 15.1 % (11-10-23 @ 20:57)      11-11 Chol 150 Direct LDL -- LDL calculated 96 HDL 22<L> Trig 161<H>    Radiology:              NOTE INCOMPLETE/ IN PROGRESS  *Please wait for attending attestation for official recommendations.     HPI:  54 yo male w/ PMH HTN, T2DM, recently moved [place] [facility] ~9 months ago. Since moving from [facility] ~9 months ago, pt has not been on any medication; has not arranged a PMD since moving here.  Pt went to an outside ophthalmologist for c/o blurry vision, had findings c/w diabetic retinopathy, was under impression that he might have had "bleeding in his eye" and came to the ED for this reason. In the ED, outpatient ophtho report was reviewed which showed findings c/w diabetic retinopathy. Patient found to have hyperglycemic with BG to 500s and A1c 15.1%.     Consulted for: Hyperglycemia, uncontrolled T2DM    Diabetes history:  Dx with T2DM 3-4 years ago in [facility].  Has been on metformin 500mg BID since. Has not been on medications for last 9 months after moving from [facility]. Did not establish care with PCP and renew medications.  He reports hx of hospitalization for hyperglycemia when he was given SC insulin during hospitalization. Denies hx of DKA requiring insulin gtt.  Has DM retinopathy. Occasional neuropathy. Denies CAD, CVA, PAD, kidney disease.  Reports polyuria, polydipsia  SMBG: does not neck. Has glucometer.    Family hx of mother with DM  No smoking no alcohol    PAST MEDICAL & SURGICAL HISTORY:  Diabetes  (hx/o DKA in [facility])      HTN (hypertension)      No significant past surgical history          Outpatient Medications:  metformin 500mg BID    MEDICATIONS  (STANDING):  dextrose 5%. 1000 milliLiter(s) (100 mL/Hr) IV Continuous <Continuous>  dextrose 5%. 1000 milliLiter(s) (50 mL/Hr) IV Continuous <Continuous>  dextrose 50% Injectable 25 Gram(s) IV Push once  dextrose 50% Injectable 25 Gram(s) IV Push once  dextrose 50% Injectable 12.5 Gram(s) IV Push once  glucagon  Injectable 1 milliGRAM(s) IntraMuscular once  insulin lispro (ADMELOG) corrective regimen sliding scale   SubCutaneous at bedtime  insulin lispro (ADMELOG) corrective regimen sliding scale   SubCutaneous three times a day before meals  insulin lispro Injectable (ADMELOG) 4 Unit(s) SubCutaneous three times a day before meals    MEDICATIONS  (PRN):  dextrose Oral Gel 15 Gram(s) Oral once PRN Blood Glucose LESS THAN 70 milliGRAM(s)/deciliter      Allergies    No Known Allergies    Intolerances      Review of Systems:  Constitutional: No fever  Eyes: No blurry vision  Neuro: No tremors  HEENT: No pain  Cardiovascular: No chest pain, palpitations  Respiratory: No SOB, no cough  GI: No nausea, vomiting, abdominal pain  : No dysuria  Skin: no rash  Psych: no depression  Endocrine: no polyuria, polydipsia  Hem/lymph: no swelling  Osteoporosis: no fractures    ALL OTHER SYSTEMS REVIEWED AND NEGATIVE    UNABLE TO OBTAIN    PHYSICAL EXAM:  VITALS: T(C): 36.8 (11-11-23 @ 13:34)  T(F): 98.2 (11-11-23 @ 13:34), Max: 98.2 (11-10-23 @ 19:19)  HR: 68 (11-11-23 @ 13:34) (63 - 84)  BP: 129/87 (11-11-23 @ 13:34) (129/87 - 196/109)  RR:  (16 - 18)  SpO2:  (100% - 100%)  Wt(kg): --  GENERAL: NAD, well-groomed, well-developed  EYES: No proptosis, no lid lag, anicteric  HEENT:  Atraumatic, Normocephalic, moist mucous membranes  THYROID: Normal size, no palpable nodules  RESPIRATORY: Clear to auscultation bilaterally; No rales, rhonchi, wheezing  CARDIOVASCULAR: Regular rate and rhythm; No murmurs; no peripheral edema  GI: Soft, nontender, non distended, normal bowel sounds  SKIN: Dry, intact, No rashes or lesions  MUSCULOSKELETAL: Full range of motion, normal strength  NEURO: sensation intact, extraocular movements intact, no tremor  PSYCH: Alert and oriented x 3, normal affect, normal mood  CUSHING'S SIGNS: no striae      CAPILLARY BLOOD GLUCOSE      POCT Blood Glucose.: 208 mg/dL (11 Nov 2023 11:24)  POCT Blood Glucose.: 241 mg/dL (11 Nov 2023 07:47)  POCT Blood Glucose.: 293 mg/dL (11 Nov 2023 03:00)  POCT Blood Glucose.: 380 mg/dL (11 Nov 2023 00:31)  POCT Blood Glucose.: 379 mg/dL (10 Nov 2023 22:34)  POCT Blood Glucose.: 466 mg/dL (10 Nov 2023 20:55)  POCT Blood Glucose.: 511 mg/dL (10 Nov 2023 19:25)                            13.2   5.37  )-----------( 201      ( 11 Nov 2023 06:53 )             39.3       11-11    134<L>  |  100  |  16  ----------------------------<  278<H>  4.2   |  23  |  0.96    eGFR: 95    Ca    9.0      11-11  Mg     2.00     11-10  Phos  4.0     11-10    TPro  7.0  /  Alb  3.7  /  TBili  0.4  /  DBili  x   /  AST  14  /  ALT  10  /  AlkPhos  59  11-11      Thyroid Function Tests:  11-10 @ 20:57 TSH 1.82 FreeT4 -- T3 -- Anti TPO -- Anti Thyroglobulin Ab -- TSI --      A1C with Estimated Average Glucose Result: 15.1 % (11-10-23 @ 20:57)      11-11 Chol 150 Direct LDL -- LDL calculated 96 HDL 22<L> Trig 161<H>    Radiology:

## 2023-11-11 NOTE — ED CDU PROVIDER INITIAL DAY NOTE - DETAILS
Hyperglycemia management, glucose monitoring, AM labs, Endocrine consultation, diabetic education, supportive care, general observation care / monitoring.

## 2023-11-11 NOTE — ED CDU PROVIDER INITIAL DAY NOTE - NSICDXPASTMEDICALHX_GEN_ALL_CORE_FT
PAST MEDICAL HISTORY:  Diabetes (hx/o DKA in [facility])    HTN (hypertension)      PAST MEDICAL HISTORY:  Diabetes (hx/o DKA in [facility])    HTN (hypertension)      PAST MEDICAL HISTORY:  Diabetes (hx/o DKA in [facility])    HTN (hypertension)

## 2023-11-11 NOTE — ED CDU PROVIDER INITIAL DAY NOTE - OBJECTIVE STATEMENT
Patient Is a 54 y/o M with hx of DM and HTN PTED with blurry vision. Patient moved from [facility] to the  9 mos ago. Has not taken any oral dm or htn beds. previously on metformin and catapril. Has had micu admissions in [facility] for dka. Never on insulin at home. No PMD f/u here. Went to ophthalmologist and was referred to ED for "blood in eye". Paperwork from physician showed c/f diabetic retinopathy and cotton wool spots. No detachments. Normal pressures. VA 20/60 OD and OS. No fever. chills, nausea, vomiting, urinary or bowel changes.    CDU BLACK Arciniega Note----  ED Provider HPI as above, reviewed.  Pt is a 54 yo male, PMH HTN, DM, with past hx/o DKA in [facility], but was reportedly never Rx'd insulin, was only on metformin.  Since moving from [facility] ~9 months ago, pt has not been on any medication; has not arranged a PMD since moving here.  Pt went to an outside ophthalmologist for c/o blurry vision, had findings c/w diabetic retinopathy, as above.  In the ED, pt afebrile, Initial /109; VS otherwise stable.  BP downtrended to 172/109, then 141/89; amlodipine 5 milligrams dose was then ordered in ED.  WBC 5.53, Hb 12.9, Hct 38.4, platelets 209.  CMP: sodium 130, anion gap 12, BUN 25, creatinine 1.13, glucose 521.  Lipase 94.  A1c 15.1, beta hydroxy butyrate <0.0.  VBG: pH 7.31, lactate 1.3.  TSH 1.82.  CXR was performed; per prelim radiology report: "...IMPRESSION: Clear lungs.".  Pt was given 3 liters IV fluids; pt was sent to CDU for continued care plan:  Hyperglycemia management, glucose monitoring, AM labs, Endocrine consultation, diabetic education, supportive care, general observation care / monitoring. Patient Is a 54 y/o M with hx of DM and HTN PTED with blurry vision. Patient moved from [facility] to the  9 mos ago. Has not taken any oral dm or htn beds. previously on metformin and catapril. Has had micu admissions in [facility] for dka. Never on insulin at home. No PMD f/u here. Went to ophthalmologist and was referred to ED for "blood in eye". Paperwork from physician showed c/f diabetic retinopathy and cotton wool spots. No detachments. Normal pressures. VA 20/60 OD and OS. No fever. chills, nausea, vomiting, urinary or bowel changes.    CDU BLACK Arciniega Note----  ED Provider HPI as above, reviewed.  Pt is a 54 yo male, PMH HTN, DM, with past hx/o DKA in [facility], but was reportedly never Rx'd insulin, was only on metformin.  Since moving from [facility] ~9 months ago, pt has not been on any medication; has not arranged a PMD since moving here.  Pt went to an outside ophthalmologist for c/o blurry vision, had findings c/w diabetic retinopathy, as above.  In the ED, pt afebrile, Initial /109; VS otherwise stable.  BP downtrended to 172/109, then 141/89; amlodipine 5 milligrams dose was then ordered in ED.  WBC 5.53, Hb 12.9, Hct 38.4, platelets 209.  CMP: sodium 130, anion gap 12, BUN 25, creatinine 1.13, glucose 521.  Lipase 94.  A1c 15.1, beta hydroxy butyrate <0.0.  VBG: pH 7.31, lactate 1.3.  TSH 1.82.  CXR was performed; per prelim radiology report: "...IMPRESSION: Clear lungs.".  Pt was given 3 liters IV fluids; pt was sent to CDU for continued care plan:  Hyperglycemia management, glucose monitoring, AM labs, Endocrine consultation, diabetic education, supportive care, general observation care / monitoring. Patient Is a 52 y/o M with hx of DM and HTN PTED with blurry vision. Patient moved from [facility] to the  9 mos ago. Has not taken any oral dm or htn beds. previously on metformin and catapril. Has had micu admissions in [facility] for dka. Never on insulin at home. No PMD f/u here. Went to ophthalmologist and was referred to ED for "blood in eye". Paperwork from physician showed c/f diabetic retinopathy and cotton wool spots. No detachments. Normal pressures. VA 20/60 OD and OS. No fever. chills, nausea, vomiting, urinary or bowel changes.    CDU BLACK Arciniega Note----  ED Provider HPI as above, reviewed.  Pt is a 54 yo male, PMH HTN, DM, with past hx/o DKA in [facility], but was reportedly never Rx'd insulin, was only on metformin.  Since moving from [facility] ~9 months ago, pt has not been on any medication; has not arranged a PMD since moving here.  Pt went to an outside ophthalmologist for c/o blurry vision, had findings c/w diabetic retinopathy, as above.  In the ED, pt afebrile, Initial /109; VS otherwise stable.  BP downtrended to 172/109, then 141/89; amlodipine 5 milligrams dose was then ordered in ED.  WBC 5.53, Hb 12.9, Hct 38.4, platelets 209.  CMP: sodium 130, anion gap 12, BUN 25, creatinine 1.13, glucose 521.  Lipase 94.  A1c 15.1, beta hydroxy butyrate <0.0.  VBG: pH 7.31, lactate 1.3.  TSH 1.82.  CXR was performed; per prelim radiology report: "...IMPRESSION: Clear lungs.".  Pt was given 3 liters IV fluids; pt was sent to CDU for continued care plan:  Hyperglycemia management, glucose monitoring, AM labs, Endocrine consultation, diabetic education, supportive care, general observation care / monitoring.

## 2023-11-11 NOTE — ED ADULT NURSE REASSESSMENT NOTE - NS ED NURSE REASSESS COMMENT FT1
Break RN: pt a&ox4, denying chest pain, sob, n+v, headache, dizziness, fever, chills, vision changes at this time. Breathing even, unlabored. Safety maintained.

## 2023-11-11 NOTE — ED CDU PROVIDER INITIAL DAY NOTE - ATTENDING APP SHARED VISIT CONTRIBUTION OF CARE
I performed a face to face evaluation of this patient and obtained a history and performed a full exam.  I agree with the history, physical exam and plan of the JOIE

## 2023-11-11 NOTE — ED CDU PROVIDER DISPOSITION NOTE - PATIENT PORTAL LINK FT
You can access the FollowMyHealth Patient Portal offered by [facility] by registering at the following website: [email or web address]. By joining [employer]’s FollowMyHealth portal, you will also be able to view your health information using other applications (apps) compatible with our system. You can access the FollowMyHealth Patient Portal offered by [facility] by registering at the following website: [email or web address]. By joining [employer]’s FollowMyHealth portal, you will also be able to view your health information using other applications (apps) compatible with our system. You can access the FollowMyHealth Patient Portal offered by [facility] by registering at the following website: [email or web address]. By joining [employer]’s FollowMyHealth portal, you will also be able to view your health information using other applications (apps) compatible with our system.

## 2023-11-11 NOTE — CONSULT NOTE ADULT - ATTENDING COMMENTS
Poorly controlled diabetes complicated by retinopathy. Pt advised of need for better control of BS to reduce risk of complications. Agree with recommendations. Need for use of insulin emphasized.

## 2023-11-12 LAB
CULTURE RESULTS: SIGNIFICANT CHANGE UP
SPECIMEN SOURCE: SIGNIFICANT CHANGE UP

## 2023-11-12 RX ORDER — INSULIN ASPART 100 [IU]/ML
4 INJECTION, SOLUTION SUBCUTANEOUS
Qty: 15 | Refills: 0
Start: 2023-11-12 | End: 2023-12-11

## 2023-11-12 RX ORDER — INSULIN LISPRO 100/ML
4 VIAL (ML) SUBCUTANEOUS
Qty: 1 | Refills: 0
Start: 2023-11-12

## 2023-11-12 NOTE — ED POST DISCHARGE NOTE - RESULT SUMMARY
Pt's family member called the ED; stated she was having issue at [facility] pharmacy whereas pharmacist stated Admelog was not covered, and was advising family member to call insurance to find out what med was covered.  I called [facility] pharmacy where Rx's were sent from pt's 11/10/23 ED visit; spoke with pharmacist "Tonia".  Pharmacist was abrupt and dismissive; at first, stated "meds are covered, but not Freestyle Brennan machine or strips".  She would not tell me what was covered.  I gave a verbal Rx for "glucometer as per pt's insurance" and "test strips as per pt's insurance" and after my insistence, pharmacist informed me that One Touch Ultra machine/strips are covered and will process Rx's accordingly.  Family member, who was still on the other line, informed me that a male staff member in pharmacy had told her that Admelog was not covered.  Pharmacist Tonia first told me that "all meds" were covered, but when pressed on the Admelog issue, told me Admelog was not covered; wouldn't tell me what was covered.  I asked her to try Novolog, Humalog; she stated (too quickly) that these weren't covered and that she was too busy to take time to run Rx's.  I sent Rx for generic insulin Lispro KwikPen and informed pt's family member that the issue might be one brand name not being covered vs another, but that generic for Admelog Solostar should be able to be filled.  I sent Rx through and advised pt's family member to call pharmacy to see if Rx was able to be filled, and if for some reason it is not, then to call us back.  Pt's family member was appreciative and verbalized understanding to all as discussed. Pt's family member called the ED; stated she was having issue at [facility] pharmacy whereas pharmacist stated Admelog was not covered, and was advising family member to call insurance to find out what med was covered.  I called [facility] pharmacy where Rx's were sent from pt's 11/10/23 ED visit; spoke with pharmacist "Tonia".  Pharmacist was abrupt and dismissive; at first, stated "meds are covered, but not Freestyle Brennan machine or strips".  She would not tell me what was covered.  I gave a verbal Rx for "glucometer as per pt's insurance" and "test strips as per pt's insurance" and after my insistence, pharmacist informed me that One Touch Ultra machine/strips are covered and will process Rx's accordingly.  Family member, who was still on the other line, informed me that a male staff member in pharmacy had told her that Admelog was not covered.  Pharmacist Tonia first told me that "all meds" were covered, but when pressed on the Admelog issue, told me Admelog was not covered; wouldn't tell me what was covered.  I asked her to try Novolog, Humalog; she stated (too quickly) that these weren't covered and that she was too busy to take time to run Rx's.  I sent Rx for generic insulin Lispro KwikPen and informed pt's family member that the issue might be one brand name not being covered vs another, but that generic for Admelog Solostar should be able to be filled.  I sent Rx through and advised pt's family member to call pharmacy to see if Rx was able to be filled, and if for some reason it is not, then to call us back.  Pt's family member was appreciative and verbalized understanding to all as discussed. Pt's family member called the ED; stated she was having issue at [facility] pharmacy whereas pharmacist stated Admelog was not covered, and was advising family member to call insurance to find out what med was covered.  I called [facility] pharmacy where Rx's were sent from pt's 11/10/23 ED visit; spoke with pharmacist "Tonia".  Pharmacist was abrupt and dismissive; at first, stated "meds are covered, but not Freestyle Brennan machine or strips".  She would not tell me what was covered.  I gave a verbal Rx for "glucometer as per pt's insurance" and "test strips as per pt's insurance" and after my insistence, pharmacist informed me that One Touch Ultra machine/strips are covered and will process Rx's accordingly.  Family member, who was still on the other line, informed me that a male staff member in pharmacy had told her that Admelog was not covered.  Pharmacist Tonia first told me that "all meds" were covered, but when pressed on the Admelog issue, told me Admelog was not covered; wouldn't tell me what was covered.  I asked her to try Novolog, Humalog; she stated (too quickly) that these weren't covered and that she was too busy to take time to run Rx's.  I sent Rx for generic insulin Lispro KwikPen and informed pt's family member that the issue might be one brand name not being covered vs another, but that generic for Admelog Solostar should be able to be filled.  I sent Rx through and advised pt's family member to call pharmacy to see if Rx was able to be filled, and if for some reason it is not, then to call us back.  Pt's family member was appreciative and verbalized understanding to all as discussed.

## 2023-11-12 NOTE — ED POST DISCHARGE NOTE - DETAILS
Pt called stating that Insurance doesn't cover Admelog but she verified with pharmacist and Novolog will be covered. Rx sent to pharmacy.

## 2024-02-07 ENCOUNTER — INPATIENT (INPATIENT)
Facility: HOSPITAL | Age: 54
LOS: 2 days | Discharge: HOME | End: 2024-02-10
Attending: INTERNAL MEDICINE | Admitting: INTERNAL MEDICINE
Payer: COMMERCIAL

## 2024-02-07 VITALS
TEMPERATURE: 98 F | DIASTOLIC BLOOD PRESSURE: 118 MMHG | HEART RATE: 94 BPM | OXYGEN SATURATION: 100 % | RESPIRATION RATE: 16 BRPM | SYSTOLIC BLOOD PRESSURE: 175 MMHG

## 2024-02-07 DIAGNOSIS — R07.9 CHEST PAIN, UNSPECIFIED: ICD-10-CM

## 2024-02-07 LAB
ALBUMIN SERPL ELPH-MCNC: 4.5 G/DL — SIGNIFICANT CHANGE UP (ref 3.3–5)
ALP SERPL-CCNC: 55 U/L — SIGNIFICANT CHANGE UP (ref 40–120)
ALT FLD-CCNC: 15 U/L — SIGNIFICANT CHANGE UP (ref 4–41)
ANION GAP SERPL CALC-SCNC: 13 MMOL/L — SIGNIFICANT CHANGE UP (ref 7–14)
AST SERPL-CCNC: 17 U/L — SIGNIFICANT CHANGE UP (ref 4–40)
BASOPHILS # BLD AUTO: 0.06 K/UL — SIGNIFICANT CHANGE UP (ref 0–0.2)
BASOPHILS NFR BLD AUTO: 0.7 % — SIGNIFICANT CHANGE UP (ref 0–2)
BILIRUB SERPL-MCNC: 0.3 MG/DL — SIGNIFICANT CHANGE UP (ref 0.2–1.2)
BUN SERPL-MCNC: 25 MG/DL — HIGH (ref 7–23)
CALCIUM SERPL-MCNC: 9.4 MG/DL — SIGNIFICANT CHANGE UP (ref 8.4–10.5)
CHLORIDE SERPL-SCNC: 102 MMOL/L — SIGNIFICANT CHANGE UP (ref 98–107)
CO2 SERPL-SCNC: 24 MMOL/L — SIGNIFICANT CHANGE UP (ref 22–31)
CREAT SERPL-MCNC: 1.19 MG/DL — SIGNIFICANT CHANGE UP (ref 0.5–1.3)
EGFR: 74 ML/MIN/1.73M2 — SIGNIFICANT CHANGE UP
EOSINOPHIL # BLD AUTO: 0.42 K/UL — SIGNIFICANT CHANGE UP (ref 0–0.5)
EOSINOPHIL NFR BLD AUTO: 4.8 % — SIGNIFICANT CHANGE UP (ref 0–6)
GLUCOSE SERPL-MCNC: 104 MG/DL — HIGH (ref 70–99)
HCT VFR BLD CALC: 40.9 % — SIGNIFICANT CHANGE UP (ref 39–50)
HGB BLD-MCNC: 13.9 G/DL — SIGNIFICANT CHANGE UP (ref 13–17)
IANC: 3.51 K/UL — SIGNIFICANT CHANGE UP (ref 1.8–7.4)
IMM GRANULOCYTES NFR BLD AUTO: 0.2 % — SIGNIFICANT CHANGE UP (ref 0–0.9)
LYMPHOCYTES # BLD AUTO: 4.14 K/UL — HIGH (ref 1–3.3)
LYMPHOCYTES # BLD AUTO: 47.8 % — HIGH (ref 13–44)
MCHC RBC-ENTMCNC: 28.7 PG — SIGNIFICANT CHANGE UP (ref 27–34)
MCHC RBC-ENTMCNC: 34 GM/DL — SIGNIFICANT CHANGE UP (ref 32–36)
MCV RBC AUTO: 84.3 FL — SIGNIFICANT CHANGE UP (ref 80–100)
MONOCYTES # BLD AUTO: 0.52 K/UL — SIGNIFICANT CHANGE UP (ref 0–0.9)
MONOCYTES NFR BLD AUTO: 6 % — SIGNIFICANT CHANGE UP (ref 2–14)
NEUTROPHILS # BLD AUTO: 3.51 K/UL — SIGNIFICANT CHANGE UP (ref 1.8–7.4)
NEUTROPHILS NFR BLD AUTO: 40.5 % — LOW (ref 43–77)
NRBC # BLD: 0 /100 WBCS — SIGNIFICANT CHANGE UP (ref 0–0)
NRBC # FLD: 0 K/UL — SIGNIFICANT CHANGE UP (ref 0–0)
PLATELET # BLD AUTO: 220 K/UL — SIGNIFICANT CHANGE UP (ref 150–400)
POTASSIUM SERPL-MCNC: 3.7 MMOL/L — SIGNIFICANT CHANGE UP (ref 3.5–5.3)
POTASSIUM SERPL-SCNC: 3.7 MMOL/L — SIGNIFICANT CHANGE UP (ref 3.5–5.3)
PROT SERPL-MCNC: 8.2 G/DL — SIGNIFICANT CHANGE UP (ref 6–8.3)
RBC # BLD: 4.85 M/UL — SIGNIFICANT CHANGE UP (ref 4.2–5.8)
RBC # FLD: 12.8 % — SIGNIFICANT CHANGE UP (ref 10.3–14.5)
SODIUM SERPL-SCNC: 139 MMOL/L — SIGNIFICANT CHANGE UP (ref 135–145)
TROPONIN T, HIGH SENSITIVITY RESULT: 9 NG/L — SIGNIFICANT CHANGE UP
WBC # BLD: 8.67 K/UL — SIGNIFICANT CHANGE UP (ref 3.8–10.5)
WBC # FLD AUTO: 8.67 K/UL — SIGNIFICANT CHANGE UP (ref 3.8–10.5)

## 2024-02-07 PROCEDURE — 99285 EMERGENCY DEPT VISIT HI MDM: CPT

## 2024-02-07 PROCEDURE — 99223 1ST HOSP IP/OBS HIGH 75: CPT

## 2024-02-07 RX ORDER — AMLODIPINE BESYLATE 2.5 MG/1
5 TABLET ORAL ONCE
Refills: 0 | Status: COMPLETED | OUTPATIENT
Start: 2024-02-07 | End: 2024-02-07

## 2024-02-07 RX ADMIN — AMLODIPINE BESYLATE 5 MILLIGRAM(S): 2.5 TABLET ORAL at 21:42

## 2024-02-07 NOTE — H&P ADULT - PROBLEM SELECTOR PLAN 5
DVT ppx - SCDs  Diet - DASH/CC diet  Activity - OOB with assistance, increase as tolerated    Fall and aspiration precautions

## 2024-02-07 NOTE — H&P ADULT - PROBLEM SELECTOR PLAN 1
- Patient with intermittent chest pain with typical (pressure like, associated with palpitations, SOB, dizziness) and atypical features (no definite exertional component, R sided) but given risk factors (DM, HTN, family history of CAD) concern for likely cardiac chest pain  - Will monitor on telemetry, check troponin x2 (initial neg at 9), check TTE  - Patient without PCP or cardiologist -> would benefit from establishing care with both -> cardiology consult as per primary team in AM  - Check TSH, A1C, lipid profile

## 2024-02-07 NOTE — ED ADULT NURSE REASSESSMENT NOTE - NS ED NURSE REASSESS COMMENT FT1
Pt received to results waiting. Pt A&ox4, ambulatory, on room air. Pt BP elevated, pt states he did not take his BP medications today. Pt unsure what medication he takes. Pt denies chest pain, HA, SOB, dizziness, N/V/D, fever/chills. Pt respirations even and unlabored, chest rise and fall equal b/l. .Stretcher in lowest position, pt safety maintained.

## 2024-02-07 NOTE — CONSULT NOTE ADULT - ASSESSMENT
54 yo male hx of HTN and DM presenting to ED after being found to have Mandujano spots outpatient.     #Mandujano spots   - patient with signs of diabetic retinopathy OU   - OS with Mandujano Spots superiorly   - Patient with symptoms of chest pain and shortness of breadth for previous 3 weeks   - Recommend CBC, CMP, ESR/CRP, blood cultures and TTE for workup of Mandujano Spots   - Rest of care as per primary team  - Patient can follow up with outpatient ophthalmologist Dr. Luis    Outpatient follow-up: Patient should follow-up with his/her ophthalmologist or with  Department of Ophthalmology upon discharge at the address below      Department of Ophthalmology  [address] [place] [address]. [address]  [place] [address]  [phone]

## 2024-02-07 NOTE — ED PROVIDER NOTE - CARE PLAN
Detail Level: Zone Initiate Treatment: Daily moisturizing cream Cerave or Cetaphil Continue Regimen: Keep toenails clipped short, 1 Principal Discharge DX:	Chest pain

## 2024-02-07 NOTE — ED ADULT NURSE NOTE - OBJECTIVE STATEMENT
Pt received to intake 10C, A&O x4, ambulatory, hx of DM2 and HTN, coming to ED with complaints of chest pain. Pt reports having intermittent chest pain for =past 2 weeks, states it feels like his heart is racing fast for about 15 minutes, associated with SOB, and occasional heart burn. Pt states he was also seen by MD recently for blurriness to R eye, told to come to ED for cardiac workup. Pt denies HA, double/blurry vision at this time, current chest pain or SOB, n/v/d, numbness/tingling, weakness. 20G IV placed to the L AC, labs drawn and sent, safety maintained, RR equal and unlabored, awaiting results at this time.

## 2024-02-07 NOTE — H&P ADULT - ASSESSMENT
This is a 53M with history of DM2 and HTN who presents to the hospital with complaints of intermittent chest pain and R eye blurry vision over the past 2-3 weeks.

## 2024-02-07 NOTE — H&P ADULT - NSHPREVIEWOFSYSTEMS_GEN_ALL_CORE
REVIEW OF SYSTEMS:    CONSTITUTIONAL: No weakness, fevers or chills  EYES: +R eye blurry vision and light sensitivity, No visual changes or eye discharge  ENT: No rhinorrhea or sore throat  NECK: No pain or stiffness  RESPIRATORY: No cough, wheezing, hemoptysis; No shortness of breath  CARDIOVASCULAR: +R sided pressure like non-radiating chest pain with associated palpitations, SOB, and dizziness; No syncope; No lower extremity edema  GASTROINTESTINAL: No abdominal or epigastric pain. No nausea, vomiting, or hematemesis; No diarrhea or constipation. No melena or hematochezia.  BACK: No back pain  GENITOURINARY: No dysuria, frequency or hematuria  NEUROLOGICAL: +L foot and L fingertips peripheral neuropathy; No focal numbness or weakness  SKIN: No itching, burning, rashes, or lesions

## 2024-02-07 NOTE — H&P ADULT - PROBLEM SELECTOR PLAN 4
- Poorly controlled HTN in setting of medication non-compliance  - Will restart his home amlodipine with higher hold parameters for now, will also start patient on lisinopril given his history of DM2  - Monitor BPs, readjust hold parameters as needed  - Patient without PCP -> will need referral to one on discharge

## 2024-02-07 NOTE — H&P ADULT - NSHPLABSRESULTS_GEN_ALL_CORE
LABS and ADDITIONAL STUDIES:                        13.9   8.67  )-----------( 220      ( 07 Feb 2024 21:00 )             40.9     139  |  102  |  25<H>  ----------------------------<  104<H>     02-07  3.7   |  24  |  1.19    Ca    9.4      07 Feb 2024 21:00    TPro  8.2  /  Alb  4.5  /  TBili  0.3  /  DBili  x   /  AST  17  /  ALT  15  /  AlkPhos  55  02-07    hs Troponin, T - 9 ng/L (02-07-24 @ 21:00)    EKG - NSR at 86, QTc 421, LVH, no significant ST-T wave changes LABS and ADDITIONAL STUDIES:                        13.9   8.67  )-----------( 220      ( 07 Feb 2024 21:00 )             40.9     139  |  102  |  25<H>  ----------------------------<  104<H>     02-07  3.7   |  24  |  1.19    Ca    9.4      07 Feb 2024 21:00    TPro  8.2  /  Alb  4.5  /  TBili  0.3  /  DBili  x   /  AST  17  /  ALT  15  /  AlkPhos  55  02-07    hs Troponin, T - 9 ng/L (02-07-24 @ 21:00)    EKG - NSR at 86, QTc 421, LVH, no significant ST-T wave changes    < from: Xray Chest 2 Views PA/Lat (02.08.24 @ 00:27) >  ******PRELIMINARY REPORT******    FINDINGS:  The heart size is normal.  The lungs are clear.  No pneumothorax or pleural effusion.  No acute osseous abnormality.    IMPRESSION:  Clear lungs.  < end of copied text >

## 2024-02-07 NOTE — H&P ADULT - PROBLEM SELECTOR PLAN 3
- Poorly compliant with medications as per family but blood glucose here relatively well controlled ( on presentation,  on CMP)  - Reports taking premeal insulin but unclear how compliant he is  - Will start on lantus 7U qHS, place on low dose ISS, FS qAC, CC diet  - Consider endocrine eval in AM (as per primary team)  - Check TSH and lipid profile, might need statin therapy pending lipid profile

## 2024-02-07 NOTE — H&P ADULT - HISTORY OF PRESENT ILLNESS
This is 53M with history of HTN and DM2 who presents to the hospital with complaints of intermittent chest pain and R eye blurry vision for the past 2-3 weeks.     Said that he started to note R sided, pressure like, intermittent, non-radiating chest pain over the past 2-3 weeks. Said that the chest pain is not related to exertion and can occur even at rest. States that the episodes last about 10-15 minutes and are associated with palpitations, SOB, and dizziness but no syncope. Reports history of CAD with 2 of his brothers passing away away from [facility] in their 60s. No current cardiac complaints.     Also has noted R eye blurry vision over the same 2-3 weeks. Said that the blurry vision is still present and notes light sensitivity with it but no eye pain or discharge. Went to see an ophthalmologist last week (Dr. Dirk Luis) who noted the patient had ann spots and recommended he come to the hospital for evaluation. Patient eventually came to [facility] for evaluation.  This is 53M with history of HTN and DM2 who presents to the hospital with complaints of intermittent chest pain and R eye blurry vision for the past 2-3 weeks.     Said that he started to note R sided, pressure like, intermittent, non-radiating chest pain over the past 2-3 weeks. Said that the chest pain is not related to exertion and can occur even at rest. States that the episodes last about 10-15 minutes and are associated with palpitations, SOB, and dizziness but no syncope. Reports history of CAD with 2 of his brothers passing away away from [facility] in their 60s. No current cardiac complaints.     Also has noted R eye blurry vision over the same 2-3 weeks. Said that the blurry vision is still present and notes light sensitivity with it but no eye pain or discharge. Went to see an ophthalmologist last week (Dr. Dirk Luis) who noted the patient had ann spots and recommended he come to the hospital for evaluation. Patient eventually came to [facility] for evaluation.     He also reports having numbness of the L lateral foot L fingertips but not the R side. Said that he has had this numbness for about 4 months. His son sent him a medication from [facility] that helps with the numbness but the patient cannot remember the name of the medication. Patient also reports intermittent compliance with his medications. Was in [facility] in November 2023 for blurry vision and at that time was noted to have uncontrolled DM2 with A1C of 15.1, was placed in the CDU and evaluated by endocrine, was discharged with scripts for insulin, metformin, and amlodipine. Patient states that he ran out of the BP medication and is currently only taking the pre-meal insulin. Spoke with his niece Deena ([phone]) who said that the patient does not have a PCP and has not received refills for any of his medications. He is intermittently compliant with his medications and therefore still has some of his medications available. She also states that the medication his son sent from [facility] was gabapentin.     On arrival to the ED his vitals were T 98, P 94, /118, RR 16, O2 sat 100% RA. His lab work did not show significant abnormalities and his troponin was low at 9. He was evaluated by ophthalmology who noted patient with signs of diabetic retinopathy b/l and ann spots in the R eye superiorly. He was given amlodipine 5mg x1 and was admitted to medicine on telemetry.

## 2024-02-07 NOTE — ED ADULT NURSE NOTE - CHIEF COMPLAINT QUOTE
Patient c/o chest pain and SOB x 2 weeks. Sent in for infectious workup and echo. Denies fever, cough, palpitations, nausea, vomiting, dizziness, headache. Phx HTN, DM2

## 2024-02-07 NOTE — ED PROVIDER NOTE - PHYSICAL EXAMINATION
well appearing  hypertensive  20/50 both; 20/50 OS 20/70 OD  EOMI, injected  CTAB/L  s1 s2 no m/r/g  abd soft/NT/ND   ext: no edema  Neuro: CNs intact 5/5 motor UE and LE; sensation intact

## 2024-02-07 NOTE — ED PROVIDER NOTE - CLINICAL SUMMARY MEDICAL DECISION MAKING FREE TEXT BOX
Patient demonstrates no signs and symptoms of infective endocarditis that being said given ophthalmologist appreciated possible Mandujano spots will get 3 sets of cultures to help rule out infective endocarditis  Patient history is concerning for unstable angina, EKG shows LVH  Feel prudent to admit patient for cardiology workup with stress and echo  Ophthalmology can see in the emergency room or upstairs once admitted

## 2024-02-07 NOTE — ED PROVIDER NOTE - OBJECTIVE STATEMENT
53-year-old male with history of hypertension and diabetes presents after ophthalmologist instructed patient to come to the emergency room for possible Mandujano spots in right eye.  Patient also has complaints of chest pain and shortness of breath for the last 3 weeks.  States pain is intermittent lasting approximately 15 minutes to 30 minutes.  Associated with shortness of breath.  Went to see ophthalmologist due to blurry vision in right eye.  As above was told to come to the emergency room patient has had no noninvasive or invasive cardiology testing.  Patient has family history of both brothers having stents placed. Patient denies fevers or IV drug use

## 2024-02-07 NOTE — H&P ADULT - NSHPPHYSICALEXAM_GEN_ALL_CORE
Vital Signs Last 24 Hrs  T(C): 36.7 (07 Feb 2024 21:33), Max: 36.7 (07 Feb 2024 19:01)  T(F): 98 (07 Feb 2024 21:33), Max: 98 (07 Feb 2024 19:01)  HR: 79 (07 Feb 2024 21:33) (79 - 94)  BP: 178/103 (07 Feb 2024 21:33) (175/118 - 178/103)  BP(mean): --  RR: 16 (07 Feb 2024 21:33) (16 - 16)  SpO2: 100% (07 Feb 2024 21:33) (100% - 100%)    Parameters below as of 07 Feb 2024 21:33  Patient On (Oxygen Delivery Method): room air    GENERAL: No acute distress, well-developed  EYES: EOMI, Pupils currently dilated b/l (recently dilated by ophthalmology), conjunctiva and sclera clear  ENT: Neck supple, No JVD, moist mucosa  CHEST/LUNG: Clear to auscultation bilaterally; No wheeze, equal breath sounds bilaterally   BACK: No spinal tenderness  HEART: Regular rate and rhythm; No murmurs, rubs, or gallops  ABDOMEN: Soft, Nontender, Nondistended; Bowel sounds present  EXTREMITIES: +DP/PT/Radial pulses, No clubbing, cyanosis, or edema  PSYCH: Nl behavior, nl affect  NEUROLOGY: AAOx3, non-focal  SKIN: Normal color, No rashes or lesions

## 2024-02-07 NOTE — H&P ADULT - PROBLEM SELECTOR PLAN 2
- Blurry vision of the R eye x 2-3 weeks, noted to have ann spots raising concern for possible infectious endocarditis though no other skin findings noted (janeway lesions, osler nodes)  - No murmur auscultated on examination  - Will monitor on telemetry, check TTE, BCx x3 collected in ED  - Ophthalmology eval appreciated, will check ESR, CRP  - Outpatient follow up with his ophthalmologist (Dr. Luis) or with [facility] ophthalmology

## 2024-02-08 DIAGNOSIS — R07.9 CHEST PAIN, UNSPECIFIED: ICD-10-CM

## 2024-02-08 DIAGNOSIS — E78.5 HYPERLIPIDEMIA, UNSPECIFIED: ICD-10-CM

## 2024-02-08 DIAGNOSIS — H53.8 OTHER VISUAL DISTURBANCES: ICD-10-CM

## 2024-02-08 DIAGNOSIS — Z29.9 ENCOUNTER FOR PROPHYLACTIC MEASURES, UNSPECIFIED: ICD-10-CM

## 2024-02-08 DIAGNOSIS — E11.9 TYPE 2 DIABETES MELLITUS WITHOUT COMPLICATIONS: ICD-10-CM

## 2024-02-08 DIAGNOSIS — E11.65 TYPE 2 DIABETES MELLITUS WITH HYPERGLYCEMIA: ICD-10-CM

## 2024-02-08 DIAGNOSIS — I10 ESSENTIAL (PRIMARY) HYPERTENSION: ICD-10-CM

## 2024-02-08 LAB
A1C WITH ESTIMATED AVERAGE GLUCOSE RESULT: 11.4 % — HIGH (ref 4–5.6)
ALBUMIN SERPL ELPH-MCNC: 4.1 G/DL — SIGNIFICANT CHANGE UP (ref 3.3–5)
ALP SERPL-CCNC: 51 U/L — SIGNIFICANT CHANGE UP (ref 40–120)
ALT FLD-CCNC: 16 U/L — SIGNIFICANT CHANGE UP (ref 4–41)
ANION GAP SERPL CALC-SCNC: 12 MMOL/L — SIGNIFICANT CHANGE UP (ref 7–14)
AST SERPL-CCNC: 14 U/L — SIGNIFICANT CHANGE UP (ref 4–40)
BASOPHILS # BLD AUTO: 0.05 K/UL — SIGNIFICANT CHANGE UP (ref 0–0.2)
BASOPHILS NFR BLD AUTO: 0.7 % — SIGNIFICANT CHANGE UP (ref 0–2)
BILIRUB SERPL-MCNC: 0.5 MG/DL — SIGNIFICANT CHANGE UP (ref 0.2–1.2)
BUN SERPL-MCNC: 21 MG/DL — SIGNIFICANT CHANGE UP (ref 7–23)
CALCIUM SERPL-MCNC: 9.1 MG/DL — SIGNIFICANT CHANGE UP (ref 8.4–10.5)
CHLORIDE SERPL-SCNC: 103 MMOL/L — SIGNIFICANT CHANGE UP (ref 98–107)
CHOLEST SERPL-MCNC: 188 MG/DL — SIGNIFICANT CHANGE UP
CO2 SERPL-SCNC: 22 MMOL/L — SIGNIFICANT CHANGE UP (ref 22–31)
CREAT SERPL-MCNC: 1.07 MG/DL — SIGNIFICANT CHANGE UP (ref 0.5–1.3)
CRP SERPL-MCNC: <3 MG/L — SIGNIFICANT CHANGE UP
EGFR: 84 ML/MIN/1.73M2 — SIGNIFICANT CHANGE UP
EOSINOPHIL # BLD AUTO: 0.33 K/UL — SIGNIFICANT CHANGE UP (ref 0–0.5)
EOSINOPHIL NFR BLD AUTO: 4.8 % — SIGNIFICANT CHANGE UP (ref 0–6)
ERYTHROCYTE [SEDIMENTATION RATE] IN BLOOD: 18 MM/HR — HIGH (ref 1–15)
ESTIMATED AVERAGE GLUCOSE: 280 — SIGNIFICANT CHANGE UP
GLUCOSE BLDC GLUCOMTR-MCNC: 188 MG/DL — HIGH (ref 70–99)
GLUCOSE BLDC GLUCOMTR-MCNC: 224 MG/DL — HIGH (ref 70–99)
GLUCOSE BLDC GLUCOMTR-MCNC: 273 MG/DL — HIGH (ref 70–99)
GLUCOSE BLDC GLUCOMTR-MCNC: 276 MG/DL — HIGH (ref 70–99)
GLUCOSE SERPL-MCNC: 185 MG/DL — HIGH (ref 70–99)
HCT VFR BLD CALC: 38.7 % — LOW (ref 39–50)
HDLC SERPL-MCNC: 29 MG/DL — LOW
HGB BLD-MCNC: 13.2 G/DL — SIGNIFICANT CHANGE UP (ref 13–17)
IANC: 3.14 K/UL — SIGNIFICANT CHANGE UP (ref 1.8–7.4)
IMM GRANULOCYTES NFR BLD AUTO: 0.3 % — SIGNIFICANT CHANGE UP (ref 0–0.9)
LIPID PNL WITH DIRECT LDL SERPL: 125 MG/DL — HIGH
LYMPHOCYTES # BLD AUTO: 2.94 K/UL — SIGNIFICANT CHANGE UP (ref 1–3.3)
LYMPHOCYTES # BLD AUTO: 42.5 % — SIGNIFICANT CHANGE UP (ref 13–44)
MAGNESIUM SERPL-MCNC: 2 MG/DL — SIGNIFICANT CHANGE UP (ref 1.6–2.6)
MCHC RBC-ENTMCNC: 28.3 PG — SIGNIFICANT CHANGE UP (ref 27–34)
MCHC RBC-ENTMCNC: 34.1 GM/DL — SIGNIFICANT CHANGE UP (ref 32–36)
MCV RBC AUTO: 83 FL — SIGNIFICANT CHANGE UP (ref 80–100)
MONOCYTES # BLD AUTO: 0.43 K/UL — SIGNIFICANT CHANGE UP (ref 0–0.9)
MONOCYTES NFR BLD AUTO: 6.2 % — SIGNIFICANT CHANGE UP (ref 2–14)
NEUTROPHILS # BLD AUTO: 3.14 K/UL — SIGNIFICANT CHANGE UP (ref 1.8–7.4)
NEUTROPHILS NFR BLD AUTO: 45.5 % — SIGNIFICANT CHANGE UP (ref 43–77)
NON HDL CHOLESTEROL: 159 MG/DL — HIGH
NRBC # BLD: 0 /100 WBCS — SIGNIFICANT CHANGE UP (ref 0–0)
NRBC # FLD: 0 K/UL — SIGNIFICANT CHANGE UP (ref 0–0)
PHOSPHATE SERPL-MCNC: 3.9 MG/DL — SIGNIFICANT CHANGE UP (ref 2.5–4.5)
PLATELET # BLD AUTO: 203 K/UL — SIGNIFICANT CHANGE UP (ref 150–400)
POTASSIUM SERPL-MCNC: 3.7 MMOL/L — SIGNIFICANT CHANGE UP (ref 3.5–5.3)
POTASSIUM SERPL-SCNC: 3.7 MMOL/L — SIGNIFICANT CHANGE UP (ref 3.5–5.3)
PROT SERPL-MCNC: 7.5 G/DL — SIGNIFICANT CHANGE UP (ref 6–8.3)
RBC # BLD: 4.66 M/UL — SIGNIFICANT CHANGE UP (ref 4.2–5.8)
RBC # FLD: 12.8 % — SIGNIFICANT CHANGE UP (ref 10.3–14.5)
SODIUM SERPL-SCNC: 137 MMOL/L — SIGNIFICANT CHANGE UP (ref 135–145)
TRIGL SERPL-MCNC: 172 MG/DL — HIGH
TROPONIN T, HIGH SENSITIVITY RESULT: 12 NG/L — SIGNIFICANT CHANGE UP
TSH SERPL-MCNC: 1.62 UIU/ML — SIGNIFICANT CHANGE UP (ref 0.27–4.2)
WBC # BLD: 6.91 K/UL — SIGNIFICANT CHANGE UP (ref 3.8–10.5)
WBC # FLD AUTO: 6.91 K/UL — SIGNIFICANT CHANGE UP (ref 3.8–10.5)

## 2024-02-08 PROCEDURE — 99255 IP/OBS CONSLTJ NEW/EST HI 80: CPT

## 2024-02-08 PROCEDURE — 71046 X-RAY EXAM CHEST 2 VIEWS: CPT | Mod: 26

## 2024-02-08 PROCEDURE — 99233 SBSQ HOSP IP/OBS HIGH 50: CPT

## 2024-02-08 RX ORDER — INSULIN GLARGINE 100 [IU]/ML
14 INJECTION, SOLUTION SUBCUTANEOUS AT BEDTIME
Refills: 0 | Status: DISCONTINUED | OUTPATIENT
Start: 2024-02-08 | End: 2024-02-09

## 2024-02-08 RX ORDER — DEXTROSE 50 % IN WATER 50 %
12.5 SYRINGE (ML) INTRAVENOUS ONCE
Refills: 0 | Status: DISCONTINUED | OUTPATIENT
Start: 2024-02-08 | End: 2024-02-10

## 2024-02-08 RX ORDER — GABAPENTIN 400 MG/1
1 CAPSULE ORAL
Refills: 0 | DISCHARGE

## 2024-02-08 RX ORDER — LANOLIN ALCOHOL/MO/W.PET/CERES
3 CREAM (GRAM) TOPICAL AT BEDTIME
Refills: 0 | Status: DISCONTINUED | OUTPATIENT
Start: 2024-02-08 | End: 2024-02-10

## 2024-02-08 RX ORDER — DEXTROSE 50 % IN WATER 50 %
25 SYRINGE (ML) INTRAVENOUS ONCE
Refills: 0 | Status: DISCONTINUED | OUTPATIENT
Start: 2024-02-08 | End: 2024-02-10

## 2024-02-08 RX ORDER — LISINOPRIL 2.5 MG/1
5 TABLET ORAL DAILY
Refills: 0 | Status: DISCONTINUED | OUTPATIENT
Start: 2024-02-08 | End: 2024-02-10

## 2024-02-08 RX ORDER — GLUCAGON INJECTION, SOLUTION 0.5 MG/.1ML
1 INJECTION, SOLUTION SUBCUTANEOUS ONCE
Refills: 0 | Status: DISCONTINUED | OUTPATIENT
Start: 2024-02-08 | End: 2024-02-10

## 2024-02-08 RX ORDER — ACETAMINOPHEN 500 MG
650 TABLET ORAL EVERY 6 HOURS
Refills: 0 | Status: DISCONTINUED | OUTPATIENT
Start: 2024-02-08 | End: 2024-02-10

## 2024-02-08 RX ORDER — SODIUM CHLORIDE 9 MG/ML
1000 INJECTION, SOLUTION INTRAVENOUS
Refills: 0 | Status: DISCONTINUED | OUTPATIENT
Start: 2024-02-08 | End: 2024-02-10

## 2024-02-08 RX ORDER — INSULIN LISPRO 100/ML
4 VIAL (ML) SUBCUTANEOUS
Refills: 0 | Status: DISCONTINUED | OUTPATIENT
Start: 2024-02-08 | End: 2024-02-09

## 2024-02-08 RX ORDER — INSULIN LISPRO 100/ML
VIAL (ML) SUBCUTANEOUS AT BEDTIME
Refills: 0 | Status: DISCONTINUED | OUTPATIENT
Start: 2024-02-08 | End: 2024-02-10

## 2024-02-08 RX ORDER — ATORVASTATIN CALCIUM 80 MG/1
20 TABLET, FILM COATED ORAL AT BEDTIME
Refills: 0 | Status: DISCONTINUED | OUTPATIENT
Start: 2024-02-08 | End: 2024-02-10

## 2024-02-08 RX ORDER — INSULIN LISPRO 100/ML
VIAL (ML) SUBCUTANEOUS
Refills: 0 | Status: DISCONTINUED | OUTPATIENT
Start: 2024-02-08 | End: 2024-02-10

## 2024-02-08 RX ORDER — GABAPENTIN 400 MG/1
100 CAPSULE ORAL AT BEDTIME
Refills: 0 | Status: DISCONTINUED | OUTPATIENT
Start: 2024-02-08 | End: 2024-02-10

## 2024-02-08 RX ORDER — DEXTROSE 50 % IN WATER 50 %
15 SYRINGE (ML) INTRAVENOUS ONCE
Refills: 0 | Status: DISCONTINUED | OUTPATIENT
Start: 2024-02-08 | End: 2024-02-10

## 2024-02-08 RX ORDER — AMLODIPINE BESYLATE 2.5 MG/1
5 TABLET ORAL DAILY
Refills: 0 | Status: DISCONTINUED | OUTPATIENT
Start: 2024-02-08 | End: 2024-02-10

## 2024-02-08 RX ORDER — INSULIN GLARGINE 100 [IU]/ML
7 INJECTION, SOLUTION SUBCUTANEOUS AT BEDTIME
Refills: 0 | Status: DISCONTINUED | OUTPATIENT
Start: 2024-02-08 | End: 2024-02-08

## 2024-02-08 RX ORDER — ONDANSETRON 8 MG/1
4 TABLET, FILM COATED ORAL EVERY 8 HOURS
Refills: 0 | Status: DISCONTINUED | OUTPATIENT
Start: 2024-02-08 | End: 2024-02-10

## 2024-02-08 RX ADMIN — Medication 2: at 17:31

## 2024-02-08 RX ADMIN — AMLODIPINE BESYLATE 5 MILLIGRAM(S): 2.5 TABLET ORAL at 09:03

## 2024-02-08 RX ADMIN — Medication 1: at 22:09

## 2024-02-08 RX ADMIN — GABAPENTIN 100 MILLIGRAM(S): 400 CAPSULE ORAL at 22:09

## 2024-02-08 RX ADMIN — Medication 3: at 12:47

## 2024-02-08 RX ADMIN — INSULIN GLARGINE 14 UNIT(S): 100 INJECTION, SOLUTION SUBCUTANEOUS at 22:09

## 2024-02-08 RX ADMIN — LISINOPRIL 5 MILLIGRAM(S): 2.5 TABLET ORAL at 09:02

## 2024-02-08 RX ADMIN — ATORVASTATIN CALCIUM 20 MILLIGRAM(S): 80 TABLET, FILM COATED ORAL at 22:09

## 2024-02-08 RX ADMIN — Medication 4 UNIT(S): at 17:29

## 2024-02-08 RX ADMIN — Medication 1: at 09:03

## 2024-02-08 NOTE — PROGRESS NOTE ADULT - PROBLEM SELECTOR PLAN 1
- Patient with intermittent chest pain with typical (pressure like, associated with palpitations, SOB, dizziness) and atypical features (no definite exertional component, R sided) but given risk factors (DM, HTN, family history of CAD) concern for likely cardiac chest pain  - EKG reviewed, NSR no ischemic changes  - Will monitor on telemetry  - Trop flat 9 > 12  - patient denies any further chest pain  - TTE pending  - Patient without PCP or cardiologist -> would benefit from establishing care with both  - TSH wnl, A1C elevated to 11.4%, LDL elevated

## 2024-02-08 NOTE — CONSULT NOTE ADULT - SUBJECTIVE AND OBJECTIVE BOX
[facility] DEPARTMENT OF OPHTHALMOLOGY - INITIAL ADULT CONSULT  -----------------------------------------------------------------------------  Juan Gutiérrez MD, PGY-2  Contact: TEAMS  -----------------------------------------------------------------------------    HPI:   53-year-old male with history of hypertension and diabetes presents after ophthalmologist instructed patient to come to the emergency room for possible Mandujano spots in right eye.  Patient also has complaints of chest pain and shortness of breath for the last 3 weeks.  States pain is intermittent lasting approximately 15 minutes to 30 minutes.  Associated with shortness of breath.  Went to see ophthalmologist due to blurry vision in right eye.  As above was told to come to the emergency room patient has had no noninvasive or invasive cardiology testing.  Patient has family history of both brothers having stents placed. Patient denies fevers or IV drug use    Interval History: Patient kaelyn by Dr. Dirk Luis this past Friday. Found to have Mandujano spots and told ot come to the ED for systemic workup.     PMH: as above   POcHx: denies surg/laser  FH: denies glc/amd  Social History: denies etoh/tobacco  Ophthalmic Medications: none  Allergies: NKDA    Review of Systems:  Constitutional: No fever, chills  Eyes: No blurry vision, flashes, floaters, FBS, erythema, discharge, double vision, OU  Neuro: No tremors  Cardiovascular: No chest pain, palpitations  Respiratory: No SOB, no cough  GI: No nausea, vomiting, abdominal pain  : No dysuria  Skin: no rash  Psych: no depression  Endocrine: no polyuria, polydipsia  Heme/lymph: no swelling    VITALS: T(C): 36.7 (02-07-24 @ 21:33)  T(F): 98 (02-07-24 @ 21:33), Max: 98 (02-07-24 @ 19:01)  HR: 79 (02-07-24 @ 21:33) (79 - 94)  BP: 178/103 (02-07-24 @ 21:33) (175/118 - 178/103)  RR:  (16 - 16)  SpO2:  (100% - 100%)  Wt(kg): --  General: AAO x 3, appropriate mood and affect    Ophthalmology Exam:  Visual acuity (cc): 20/40 OD, 20/30 OS   Pupils: PERRL OU, no APD  Ttono: 16 OU  Extraocular movements (EOMs): Full OU, no pain, no diplopia  Confrontational Visual Field (CVF): Full OU  Color Plates: 12/12 OU    Pen Light Exam (PLE)  External: Flat OU  Lids/Lashes/Lacrimal Ducts: Flat OU    Sclera/Conjunctiva: W+Q OU  Cornea: Cl OU  Anterior Chamber: D+F OU    Iris: Flat OU  Lens: NS OU     Fundus Exam: dilated with 1% tropicamide and 2.5% phenylephrine  Approval obtained from primary team for dilation  Patient aware that pupils can remained dilated for at least 4-6 hours  Exam performed with 20D lens    Vitreous: wnl OU  Disc, cup/disc: sharp and pink, 0.4 OU  Macula: macular exudates and edema OD, exudates OS   Vessels: wnl OU  Periphery: DBH, exudates OD, CWS, DBH, exudates and Mandujano Spots OS     Labs/Imaging:  ***
  HPI:  This is 53M with history of HTN and DM2 who presents to the hospital with complaints of intermittent chest pain and R eye blurry vision for the past 2-3 weeks.     Said that he started to note R sided, pressure like, intermittent, non-radiating chest pain over the past 2-3 weeks. Said that the chest pain is not related to exertion and can occur even at rest. States that the episodes last about 10-15 minutes and are associated with palpitations, SOB, and dizziness but no syncope. Reports history of CAD with 2 of his brothers passing away away from [facility] in their 60s. No current cardiac complaints.     Also has noted R eye blurry vision over the same 2-3 weeks. Said that the blurry vision is still present and notes light sensitivity with it but no eye pain or discharge. Went to see an ophthalmologist last week (Dr. Dirk Luis) who noted the patient had ann spots and recommended he come to the hospital for evaluation. Patient eventually came to [facility] for evaluation.     He also reports having numbness of the L lateral foot L fingertips but not the R side. Said that he has had this numbness for about 4 months. His son sent him a medication from [facility] that helps with the numbness but the patient cannot remember the name of the medication. Patient also reports intermittent compliance with his medications. Was in [facility] in November 2023 for blurry vision and at that time was noted to have uncontrolled DM2 with A1C of 15.1, was placed in the CDU and evaluated by endocrine, was discharged with scripts for insulin, metformin, and amlodipine. Patient states that he ran out of the BP medication and is currently only taking the pre-meal insulin. Spoke with his niece Deena ([phone]) who said that the patient does not have a PCP and has not received refills for any of his medications. He is intermittently compliant with his medications and therefore still has some of his medications available. She also states that the medication his son sent from [facility] was gabapentin.     On arrival to the ED his vitals were T 98, P 94, /118, RR 16, O2 sat 100% RA. His lab work did not show significant abnormalities and his troponin was low at 9. He was evaluated by ophthalmology who noted patient with signs of diabetic retinopathy b/l and ann spots in the R eye superiorly. He was given amlodipine 5mg x1 and was admitted to medicine on telemetry.  (07 Feb 2024 23:23)      Consulted for: uncontrolled type 2 diabetes    This is a 52 yo M /w A PMH of HTN and DM2 who presents with blurry vision. Endocrinology consulted for evaluation of uncontrolled type 2 diabetes    Patient has DM2 for 3-4 years  Patient previously evaluated by endocrinology in November and was discharged with novolog 4 units TID AC, lantus 14 units nightly and metformin 500mg BID. Patient reports only taking the novolog 4 units before meals and nothing else  Reports sugars at home ranging 250-350s throughout the day.  Denies hypoglycemia  Denies polyuria, polydipsia, n/V or abdominal pain  Denies tingling/numbness in fingers and toes  Has not set up care with an endocrinologist yet.    FSg last night was 139. Patient did not receive insulin and fasting sugar was 188. After breakfast FSG julia to 276    PAST MEDICAL & SURGICAL HISTORY:  Diabetes  (hx/o DKA in [facility])      HTN (hypertension)      No significant past surgical history          FAMILY HISTORY:  FH: CAD (coronary artery disease) (Sibling)        Social History: denies all toxic habits    Home Medications:  gabapentin 100 mg oral capsule: 1 cap(s) orally (08 Feb 2024 00:48)      MEDICATIONS  (STANDING):  amLODIPine   Tablet 5 milliGRAM(s) Oral daily  atorvastatin 20 milliGRAM(s) Oral at bedtime  dextrose 5%. 1000 milliLiter(s) (50 mL/Hr) IV Continuous <Continuous>  dextrose 5%. 1000 milliLiter(s) (100 mL/Hr) IV Continuous <Continuous>  dextrose 50% Injectable 25 Gram(s) IV Push once  dextrose 50% Injectable 12.5 Gram(s) IV Push once  dextrose 50% Injectable 25 Gram(s) IV Push once  gabapentin 100 milliGRAM(s) Oral at bedtime  glucagon  Injectable 1 milliGRAM(s) IntraMuscular once  insulin glargine Injectable (LANTUS) 7 Unit(s) SubCutaneous at bedtime  insulin lispro (ADMELOG) corrective regimen sliding scale   SubCutaneous three times a day before meals  insulin lispro (ADMELOG) corrective regimen sliding scale   SubCutaneous at bedtime  lisinopril 5 milliGRAM(s) Oral daily    MEDICATIONS  (PRN):  acetaminophen     Tablet .. 650 milliGRAM(s) Oral every 6 hours PRN Temp greater or equal to 38C (100.4F), Mild Pain (1 - 3)  aluminum hydroxide/magnesium hydroxide/simethicone Suspension 30 milliLiter(s) Oral every 4 hours PRN Dyspepsia  dextrose Oral Gel 15 Gram(s) Oral once PRN Blood Glucose LESS THAN 70 milliGRAM(s)/deciliter  melatonin 3 milliGRAM(s) Oral at bedtime PRN Insomnia  ondansetron Injectable 4 milliGRAM(s) IV Push every 8 hours PRN Nausea and/or Vomiting      Allergies    No Known Allergies    Intolerances      Review of Systems:  Constitutional: No fever  Eyes: No blurry vision  Neuro: No tremors  HEENT: No pain  Cardiovascular: No chest pain, palpitations  Respiratory: No SOB, no cough  GI: No nausea, vomiting, abdominal pain  : No dysuria  Skin: no rash  Psych: no depression  Endocrine: no polyuria, polydipsia  Hem/lymph: no swelling  Osteoporosis: no fractures    PHYSICAL EXAM:  VITALS: T(C): 37.3 (02-08-24 @ 08:56)  T(F): 99.1 (02-08-24 @ 08:56), Max: 99.1 (02-08-24 @ 08:56)  HR: 74 (02-08-24 @ 08:56) (74 - 94)  BP: 114/86 (02-08-24 @ 08:56) (114/86 - 178/103)  RR:  (14 - 20)  SpO2:  (99% - 100%)  Wt(kg): --  GENERAL: NAD  EYES: No proptosis, no lid lag, anicteric  THYROID: Normal size, no palpable nodules  RESPIRATORY: Clear to auscultation bilaterally  CARDIOVASCULAR: Regular rate and rhythm  GI: Soft, nontender, non distended  EXT: b/l feet without wounds; 2+ pulses  PSYCH: Alert and oriented x 3, reactive mood    POCT Blood Glucose.: 276 mg/dL (02-08-24 @ 12:37)  POCT Blood Glucose.: 188 mg/dL (02-08-24 @ 08:49)  POCT Blood Glucose.: 139 mg/dL (02-07-24 @ 19:08)                            13.2   6.91  )-----------( 203      ( 08 Feb 2024 05:52 )             38.7       02-08    137  |  103  |  21  ----------------------------<  185<H>  3.7   |  22  |  1.07    eGFR: 84    Ca    9.1      02-08  Mg     2.00     02-08  Phos  3.9     02-08    TPro  7.5  /  Alb  4.1  /  TBili  0.5  /  DBili  x   /  AST  14  /  ALT  16  /  AlkPhos  51  02-08      Thyroid Function Tests:  02-08 @ 05:52 TSH 1.62 FreeT4 -- T3 -- Anti TPO -- Anti Thyroglobulin Ab -- TSI --      A1C with Estimated Average Glucose Result: 11.4 % (02-08-24 @ 05:52)  A1C with Estimated Average Glucose Result: 15.1 % (11-10-23 @ 20:57)      02-08 Chol 188 Direct LDL -- LDL calculated 125<H> HDL 29<L> Trig 172<H>, 11-11 Chol 150 Direct LDL -- LDL calculated 96 HDL 22<L> Trig 161<H>    Radiology:

## 2024-02-08 NOTE — CONSULT NOTE ADULT - ATTENDING COMMENTS
This is a 54 yo M /w A PMH of HTN and DM2 who presents with blurry vision. Endocrinology consulted for evaluation of uncontrolled type 2 diabetes. Patient noted to have hyperglycemia outpatient- was only taking novolog and didn't take his basal insulin which likely contributed to this. Patient is high risk with high level decision making due to uncontrolled diabetes (A1c >11%) which places patient at high risk for cardiovascular and cerebrovascular events. Patient with lability of glucose requiring close monitoring and insulin adjustments.  Agree with basal/bolus regimen as outlined above.

## 2024-02-08 NOTE — CONSULT NOTE ADULT - ASSESSMENT
This is a 54 yo M /w A PMH of HTN and DM2 who presents with blurry vision. Endocrinology consulted for evaluation of uncontrolled type 2 diabetes    #uncontrolled DM2 A1c improved from 15% to 11.4%  -start lantus 14 units nightly  -start admelog 4 units TID AC  -low admelog correction scales before meals and bedtime  -target glucose 100-180mg/dL  -carb consistent diet  -hypoglycemia protocol in place if needed  -RD consult    #discharge  -follow up at Endocrine Practice at [address] [place] [address], [address], [place] [address];  # [phone]  -restart basal insulin with lantus/basaglar/semglee - dose to be determined prior to discharge  -c/w novolog on discharge - dose to be determined based on inpatient requirements  -c/w metformin 500mg BID    #HTN  -c/w amlodipine 5mg daily and lisinopril 5mg daily  -BP target <130/80    #HLD  -c/w atorvastatin 20mg daily    Case discussed with Dr. Mindy Rivas MD  Endocrine Fellow  Can be reached via teams. For follow up questions, discharge recommendations, or new consults, please call answering service at [phone] (weekdays); [phone] (nights/weekends)   This is a 54 yo M /w A PMH of HTN and DM2 who presents with blurry vision.  Endocrinology consulted for evaluation of uncontrolled type 2 diabetes    #uncontrolled DM2 A1c improved from 15% to 11.4%  -start lantus 14 units nightly  -start admelog 4 units TID AC  -low admelog correction scales before meals and bedtime  -target glucose 100-180mg/dL  -carb consistent diet  -hypoglycemia protocol in place if needed  -RD consult    #discharge  -follow up at Endocrine Practice at [address] [place] [address], [address], [place] [address];  # [phone]  -restart basal insulin with lantus/basaglar/semglee - dose to be determined prior to discharge  -c/w novolog on discharge - dose to be determined based on inpatient requirements  -c/w metformin 500mg BID    #HTN  -c/w amlodipine 5mg daily and lisinopril 5mg daily  -BP target <130/80    #HLD  -c/w atorvastatin 20mg daily    Case discussed with Dr. Mindy Rivas MD  Endocrine Fellow  Can be reached via teams. For follow up questions, discharge recommendations, or new consults, please call answering service at [phone] (weekdays); [phone] (nights/weekends)

## 2024-02-09 ENCOUNTER — RESULT REVIEW (OUTPATIENT)
Age: 54
End: 2024-02-09

## 2024-02-09 ENCOUNTER — TRANSCRIPTION ENCOUNTER (OUTPATIENT)
Age: 54
End: 2024-02-09

## 2024-02-09 LAB
ANION GAP SERPL CALC-SCNC: 13 MMOL/L — SIGNIFICANT CHANGE UP (ref 7–14)
BUN SERPL-MCNC: 26 MG/DL — HIGH (ref 7–23)
CALCIUM SERPL-MCNC: 9.1 MG/DL — SIGNIFICANT CHANGE UP (ref 8.4–10.5)
CHLORIDE SERPL-SCNC: 103 MMOL/L — SIGNIFICANT CHANGE UP (ref 98–107)
CO2 SERPL-SCNC: 19 MMOL/L — LOW (ref 22–31)
CREAT SERPL-MCNC: 1.18 MG/DL — SIGNIFICANT CHANGE UP (ref 0.5–1.3)
EGFR: 74 ML/MIN/1.73M2 — SIGNIFICANT CHANGE UP
GLUCOSE BLDC GLUCOMTR-MCNC: 139 MG/DL — HIGH (ref 70–99)
GLUCOSE BLDC GLUCOMTR-MCNC: 194 MG/DL — HIGH (ref 70–99)
GLUCOSE BLDC GLUCOMTR-MCNC: 202 MG/DL — HIGH (ref 70–99)
GLUCOSE BLDC GLUCOMTR-MCNC: 293 MG/DL — HIGH (ref 70–99)
GLUCOSE SERPL-MCNC: 246 MG/DL — HIGH (ref 70–99)
HCT VFR BLD CALC: 40.2 % — SIGNIFICANT CHANGE UP (ref 39–50)
HGB BLD-MCNC: 13.4 G/DL — SIGNIFICANT CHANGE UP (ref 13–17)
MAGNESIUM SERPL-MCNC: 2.1 MG/DL — SIGNIFICANT CHANGE UP (ref 1.6–2.6)
MCHC RBC-ENTMCNC: 28.3 PG — SIGNIFICANT CHANGE UP (ref 27–34)
MCHC RBC-ENTMCNC: 33.3 GM/DL — SIGNIFICANT CHANGE UP (ref 32–36)
MCV RBC AUTO: 85 FL — SIGNIFICANT CHANGE UP (ref 80–100)
NRBC # BLD: 0 /100 WBCS — SIGNIFICANT CHANGE UP (ref 0–0)
NRBC # FLD: 0 K/UL — SIGNIFICANT CHANGE UP (ref 0–0)
PHOSPHATE SERPL-MCNC: 3.8 MG/DL — SIGNIFICANT CHANGE UP (ref 2.5–4.5)
PLATELET # BLD AUTO: 210 K/UL — SIGNIFICANT CHANGE UP (ref 150–400)
POTASSIUM SERPL-MCNC: 4.3 MMOL/L — SIGNIFICANT CHANGE UP (ref 3.5–5.3)
POTASSIUM SERPL-SCNC: 4.3 MMOL/L — SIGNIFICANT CHANGE UP (ref 3.5–5.3)
RBC # BLD: 4.73 M/UL — SIGNIFICANT CHANGE UP (ref 4.2–5.8)
RBC # FLD: 12.8 % — SIGNIFICANT CHANGE UP (ref 10.3–14.5)
SODIUM SERPL-SCNC: 135 MMOL/L — SIGNIFICANT CHANGE UP (ref 135–145)
WBC # BLD: 6.34 K/UL — SIGNIFICANT CHANGE UP (ref 3.8–10.5)
WBC # FLD AUTO: 6.34 K/UL — SIGNIFICANT CHANGE UP (ref 3.8–10.5)

## 2024-02-09 PROCEDURE — 99232 SBSQ HOSP IP/OBS MODERATE 35: CPT

## 2024-02-09 PROCEDURE — 99233 SBSQ HOSP IP/OBS HIGH 50: CPT

## 2024-02-09 PROCEDURE — 93306 TTE W/DOPPLER COMPLETE: CPT | Mod: 26

## 2024-02-09 RX ORDER — INSULIN LISPRO 100/ML
5 VIAL (ML) SUBCUTANEOUS
Refills: 0 | Status: DISCONTINUED | OUTPATIENT
Start: 2024-02-09 | End: 2024-02-10

## 2024-02-09 RX ORDER — INSULIN GLARGINE 100 [IU]/ML
16 INJECTION, SOLUTION SUBCUTANEOUS AT BEDTIME
Refills: 0 | Status: DISCONTINUED | OUTPATIENT
Start: 2024-02-09 | End: 2024-02-10

## 2024-02-09 RX ORDER — AMLODIPINE BESYLATE 2.5 MG/1
5 TABLET ORAL ONCE
Refills: 0 | Status: COMPLETED | OUTPATIENT
Start: 2024-02-09 | End: 2024-02-09

## 2024-02-09 RX ADMIN — INSULIN GLARGINE 16 UNIT(S): 100 INJECTION, SOLUTION SUBCUTANEOUS at 23:01

## 2024-02-09 RX ADMIN — Medication 4 UNIT(S): at 13:49

## 2024-02-09 RX ADMIN — Medication 2: at 09:44

## 2024-02-09 RX ADMIN — Medication 5 UNIT(S): at 18:17

## 2024-02-09 RX ADMIN — GABAPENTIN 100 MILLIGRAM(S): 400 CAPSULE ORAL at 21:43

## 2024-02-09 RX ADMIN — Medication 1: at 23:01

## 2024-02-09 RX ADMIN — AMLODIPINE BESYLATE 5 MILLIGRAM(S): 2.5 TABLET ORAL at 21:59

## 2024-02-09 RX ADMIN — Medication 4 UNIT(S): at 09:45

## 2024-02-09 RX ADMIN — ATORVASTATIN CALCIUM 20 MILLIGRAM(S): 80 TABLET, FILM COATED ORAL at 21:43

## 2024-02-09 RX ADMIN — Medication 1: at 18:16

## 2024-02-09 NOTE — DIETITIAN INITIAL EVALUATION ADULT - PERTINENT LABORATORY DATA
02-09    135  |  103  |  26<H>  ----------------------------<  246<H>  4.3   |  19<L>  |  1.18    Ca    9.1      09 Feb 2024 05:30  Phos  3.8     02-09  Mg     2.10     02-09    TPro  7.5  /  Alb  4.1  /  TBili  0.5  /  DBili  x   /  AST  14  /  ALT  16  /  AlkPhos  51  02-08  POCT Blood Glucose.: 139 mg/dL (02-09-24 @ 13:08)  A1C with Estimated Average Glucose Result: 11.4 % (02-08-24 @ 05:52)  A1C with Estimated Average Glucose Result: 15.1 % (11-10-23 @ 20:57)

## 2024-02-09 NOTE — DIETITIAN INITIAL EVALUATION ADULT - PERTINENT MEDS FT
MEDICATIONS  (STANDING):  amLODIPine   Tablet 5 milliGRAM(s) Oral daily  atorvastatin 20 milliGRAM(s) Oral at bedtime  dextrose 5%. 1000 milliLiter(s) (50 mL/Hr) IV Continuous <Continuous>  dextrose 5%. 1000 milliLiter(s) (100 mL/Hr) IV Continuous <Continuous>  dextrose 50% Injectable 25 Gram(s) IV Push once  dextrose 50% Injectable 25 Gram(s) IV Push once  dextrose 50% Injectable 12.5 Gram(s) IV Push once  gabapentin 100 milliGRAM(s) Oral at bedtime  glucagon  Injectable 1 milliGRAM(s) IntraMuscular once  insulin glargine Injectable (LANTUS) 14 Unit(s) SubCutaneous at bedtime  insulin lispro (ADMELOG) corrective regimen sliding scale   SubCutaneous at bedtime  insulin lispro (ADMELOG) corrective regimen sliding scale   SubCutaneous three times a day before meals  insulin lispro Injectable (ADMELOG) 4 Unit(s) SubCutaneous three times a day before meals  lisinopril 5 milliGRAM(s) Oral daily    MEDICATIONS  (PRN):  acetaminophen     Tablet .. 650 milliGRAM(s) Oral every 6 hours PRN Temp greater or equal to 38C (100.4F), Mild Pain (1 - 3)  aluminum hydroxide/magnesium hydroxide/simethicone Suspension 30 milliLiter(s) Oral every 4 hours PRN Dyspepsia  dextrose Oral Gel 15 Gram(s) Oral once PRN Blood Glucose LESS THAN 70 milliGRAM(s)/deciliter  melatonin 3 milliGRAM(s) Oral at bedtime PRN Insomnia  ondansetron Injectable 4 milliGRAM(s) IV Push every 8 hours PRN Nausea and/or Vomiting

## 2024-02-09 NOTE — PROVIDER CONTACT NOTE (OTHER) - ASSESSMENT
patient AxO4, does not have complaints at this time. Resting comfortably in bed with chest rise and fall

## 2024-02-09 NOTE — DIETITIAN INITIAL EVALUATION ADULT - ORAL INTAKE PTA/DIET HISTORY
Patient reports no known food allergies or food intolerances. Patient does not take any nutrition supplements at home. Patient denies any chewing or swallowing difficulty with regular solids or thin liquids. Patient reports a good appetite at baseline, typically consumes three meals daily plus snacks. Denies any recent changes in appetite.    Noted PMH type 2 diabetes mellitus and HbA1c 11.4% (2/8). Patient reports he was taking insulin. Did not follow a specific dietary pattern and has not previously received nutrition education for DM management. Patient provided with verbal and written education regarding nutrition in the context of diabetes mellitus management. Printed handout regarding Carbohydrate Counting for People with Diabetes provided to patient. Encouraged patient to follow-up with an outpatient dietitian for continued nutrition counseling. Patient verbalized understanding to the discussion.     Patient reports usual body weight as 154lb, endorses 6lb (4%) weight loss x4 months period of time (not significant)  Per Terrie CHAVEZ, noted the following weight history: 68kg (2/8), 73kg (11/11), 65.8kg (5/2)  Objective weight measurements suggest 5kg (7%) weight loss x3 months period of time (not significant)

## 2024-02-09 NOTE — DIETITIAN INITIAL EVALUATION ADULT - OTHER INFO
Patient is currently ordered for a PO diet. Patient reports a good appetite and PO intake at meals during course of admission. Patient denies any chewing or swallowing difficulty with current diet order. No report of GI distress (nausea, vomiting, diarrhea, constipation). No BMs noted per RN flowsheet documentation. Not noted to be on a bowel regimen.

## 2024-02-09 NOTE — PROGRESS NOTE ADULT - PROBLEM SELECTOR PLAN 3
- Poorly compliant with medications as per family but blood glucose here relatively well controlled ( on presentation,  on CMP)  - Reports taking premeal insulin at 4U before meals but unclear how compliant he is  - C/w lantus 14U qHS, low dose ISS, FS qAC, CC diet  - Endocrinology consulted, recs appreciated  - TSH wnl, LDL elevated to 125, will begin statin therapy  - RD consult
- Poorly compliant with medications as per family but blood glucose here relatively well controlled ( on presentation,  on CMP)  - Reports taking premeal insulin at 4U before meals but unclear how compliant he is  - Will start on lantus 7U qHS, place on low dose ISS, FS qAC, CC diet  - Endocrinology consulted, recs appreciated  - TSH wnl, LDL elevated to 125, will begin statin therapy

## 2024-02-09 NOTE — PROGRESS NOTE ADULT - ATTENDING COMMENTS
Briefly, this is a 54 y/o M with history of DM2 and HTN who presents to the hospital with complaints of intermittent chest pain and R eye blurry vision over the past 2-3 weeks. Patient noted to have mandujano spots on exam by his ophthalmologist and sent in to the hospital for further workup for possible endocarditis.    Patient seen and examined at bedside with PA student. Patient pleasant and conversant. Overall reports that he feels very well. Denies any acute complaints. Tolerating diet. Ambulating without issue. Continues to deny any chest pain or SOB. Denies any fever, nausea or vomiting. Endorses left shoulder pain but states it has been chronic for approx 2 years. Denies any focal weakness, numbness or tingling. Denies noting any new rashes. Reports that his vision feels somewhat improved.     Patient with stable vitals, remaining afebrile throughout admission. Exam largely unremarkable. Neuro exam without any focal deficits. No pharyngeal erythema, no conjunctival injection or scleral icterus. Patient with regular rate and rhythm and no audible murmurs. Lungs CTAB, no crackles or wheezing. Abdominal soft, NT, ND. No LE edema noted. No visible rashes noted on skin, no nodules noted over hands. Patient without any involuntary hand movements with eyes open and closed. TTE performed, showing EF of 69% without any WMAs or gross valvular abnormalities.    Patient admitted for further workup of infective endocarditis in setting of mandujano spots noted on recent ophtho exam. Patient thus far with grossly negative cardiac and infectious workup. Patient without any chest pain, without any fevers or symptoms suggestive of endocarditis. TTE performed which is grossly normal without any valvular abnormalities. 3 sets of blood cultures obtained on admission, currently without any preliminary growth, awaiting final results. Endocrinology following for patient's elevated A1C of 11.4%, insulin dosing being adjusted, RD assistance also appreciated. Overall, low suspicion that this patient has endocarditis. Case briefly discussed with ID team. Per recent literature, Mandujano spots may also be due to diabetic retinopathy which is far more likely in this patient with poorly controlled DM2 without other infectious symptoms. Will follow up complete blood culture results and final endocrine recommendations. Will tentatively plan for d/c home with close PCP follow up likely 2/10-2/11 once final culture data available.    Case and plan of care discussed with patient, PA student and medicine ACP.

## 2024-02-09 NOTE — PROGRESS NOTE ADULT - PROBLEM SELECTOR PLAN 1
- Patient with intermittent chest pain with typical (pressure like, associated with palpitations, SOB, dizziness) and atypical features (no definite exertional component, R sided) but given risk factors (DM, HTN, family history of CAD) concern for likely cardiac chest pain  - EKG reviewed, NSR no ischemic changes  - Will monitor on telemetry  - Trop flat 9 > 12  - patient denies any further chest pain  - TTE: Mild (grade 1) left ventricular diastolic dysfunction and trace mitral regurgitation.  - Patient without PCP or cardiologist -> would benefit from establishing care with both  - TSH wnl, A1C elevated to 11.4%, LDL elevated

## 2024-02-09 NOTE — PROGRESS NOTE ADULT - PROBLEM SELECTOR PROBLEM 2
Blurry vision, right eye
Uncontrolled type 2 diabetes mellitus with hyperglycemia, with long-term current use of insulin
Blurry vision, right eye

## 2024-02-09 NOTE — PROGRESS NOTE ADULT - PROBLEM SELECTOR PLAN 5
DVT ppx - SCDs, otherwise ambulatory  Diet - DASH/CC diet  Activity - OOB with assistance, increase as tolerated
DVT ppx - SCDs, otherwise ambulatory  Diet - DASH/CC diet  Activity - OOB with assistance, increase as tolerated

## 2024-02-09 NOTE — PROGRESS NOTE ADULT - SUBJECTIVE AND OBJECTIVE BOX
[facility] Department of Hospital Medicine  Janelle Dominguez MD  Available on MS Teams  Pager: [phone]    Patient is a 53y old  Male who presents with a chief complaint of Intermittent chest pain, +R eye blurry vision (07 Feb 2024 23:23)    OVERNIGHT EVENTS: No acute events overnight.    SUBJECTIVE: Pt seen and examined at bedside this morning. Appearing well. Pleasant and conversant. Reports that he feels well. Denies any further episodes of chest pain. Denies any nausea, vomiting or abd pain. Denies any dizziness or palpitations. Denies any muscle weakness or numbness/tingling. Denies any headache.    ADDITIONAL REVIEW OF SYSTEMS: as above.    MEDICATIONS  (STANDING):  amLODIPine   Tablet 5 milliGRAM(s) Oral daily  atorvastatin 20 milliGRAM(s) Oral at bedtime  dextrose 5%. 1000 milliLiter(s) (50 mL/Hr) IV Continuous <Continuous>  dextrose 5%. 1000 milliLiter(s) (100 mL/Hr) IV Continuous <Continuous>  dextrose 50% Injectable 12.5 Gram(s) IV Push once  dextrose 50% Injectable 25 Gram(s) IV Push once  dextrose 50% Injectable 25 Gram(s) IV Push once  gabapentin 100 milliGRAM(s) Oral at bedtime  glucagon  Injectable 1 milliGRAM(s) IntraMuscular once  insulin glargine Injectable (LANTUS) 7 Unit(s) SubCutaneous at bedtime  insulin lispro (ADMELOG) corrective regimen sliding scale   SubCutaneous three times a day before meals  insulin lispro (ADMELOG) corrective regimen sliding scale   SubCutaneous at bedtime  lisinopril 5 milliGRAM(s) Oral daily    MEDICATIONS  (PRN):  acetaminophen     Tablet .. 650 milliGRAM(s) Oral every 6 hours PRN Temp greater or equal to 38C (100.4F), Mild Pain (1 - 3)  aluminum hydroxide/magnesium hydroxide/simethicone Suspension 30 milliLiter(s) Oral every 4 hours PRN Dyspepsia  dextrose Oral Gel 15 Gram(s) Oral once PRN Blood Glucose LESS THAN 70 milliGRAM(s)/deciliter  melatonin 3 milliGRAM(s) Oral at bedtime PRN Insomnia  ondansetron Injectable 4 milliGRAM(s) IV Push every 8 hours PRN Nausea and/or Vomiting    CAPILLARY BLOOD GLUCOSE    POCT Blood Glucose.: 276 mg/dL (08 Feb 2024 12:37)  POCT Blood Glucose.: 188 mg/dL (08 Feb 2024 08:49)  POCT Blood Glucose.: 139 mg/dL (07 Feb 2024 19:08)    I&O's Summary    PHYSICAL EXAM:  Vital Signs Last 24 Hrs  T(C): 37.3 (08 Feb 2024 08:56), Max: 37.3 (08 Feb 2024 08:56)  T(F): 99.1 (08 Feb 2024 08:56), Max: 99.1 (08 Feb 2024 08:56)  HR: 74 (08 Feb 2024 08:56) (74 - 94)  BP: 114/86 (08 Feb 2024 08:56) (114/86 - 178/103)  BP(mean): --  RR: 14 (08 Feb 2024 08:56) (14 - 20)  SpO2: 100% (08 Feb 2024 08:56) (99% - 100%)    Parameters below as of 08 Feb 2024 08:56  Patient On (Oxygen Delivery Method): room air    CONSTITUTIONAL: NAD, well-developed  HEAD: Normocephalic, atraumatic  EYES: EOMI, PERRL  ENT: no rhinorrhea, no pharyngeal erythema  RESPIRATORY: No increased work of breathing, CTAB, no wheezes or crackles appreciated  CARDIOVASCULAR: RRR, S1 and S2 present, no m/r/g  ABDOMEN: soft, NT, ND, bowel sounds present  EXTREMITIES: No LE edema  MUSCULOSKELETAL: no joint swelling, no tenderness to palpation  NEURO: A&Ox3, moving all extremities    LABS:                        13.2   6.91  )-----------( 203      ( 08 Feb 2024 05:52 )             38.7     02-08    137  |  103  |  21  ----------------------------<  185<H>  3.7   |  22  |  1.07    Ca    9.1      08 Feb 2024 05:52  Phos  3.9     02-08  Mg     2.00     02-08    TPro  7.5  /  Alb  4.1  /  TBili  0.5  /  DBili  x   /  AST  14  /  ALT  16  /  AlkPhos  51  02-08    Urinalysis Basic - ( 08 Feb 2024 05:52 )    Color: x / Appearance: x / SG: x / pH: x  Gluc: 185 mg/dL / Ketone: x  / Bili: x / Urobili: x   Blood: x / Protein: x / Nitrite: x   Leuk Esterase: x / RBC: x / WBC x   Sq Epi: x / Non Sq Epi: x / Bacteria: x    RADIOLOGY & ADDITIONAL TESTS:    Results Reviewed:   Imaging Personally Reviewed:  Electrocardiogram Personally Reviewed:    COORDINATION OF CARE:  Care Discussed with Consultants/Other Providers [Y/N]:  Prior or Outpatient Records Reviewed [Y/N]:  
Chief Complaint: Type 2 Dm     History: Pt seen at bedside. Pt tolerating oral diet. Pt denies nausea and vomiting/any signs of hypoglycemia. Pt reports an adequate appetite     MEDICATIONS  (STANDING):  amLODIPine   Tablet 5 milliGRAM(s) Oral daily  atorvastatin 20 milliGRAM(s) Oral at bedtime  dextrose 5%. 1000 milliLiter(s) (50 mL/Hr) IV Continuous <Continuous>  dextrose 5%. 1000 milliLiter(s) (100 mL/Hr) IV Continuous <Continuous>  dextrose 50% Injectable 12.5 Gram(s) IV Push once  dextrose 50% Injectable 25 Gram(s) IV Push once  dextrose 50% Injectable 25 Gram(s) IV Push once  gabapentin 100 milliGRAM(s) Oral at bedtime  glucagon  Injectable 1 milliGRAM(s) IntraMuscular once  insulin glargine Injectable (LANTUS) 16 Unit(s) SubCutaneous at bedtime  insulin lispro (ADMELOG) corrective regimen sliding scale   SubCutaneous three times a day before meals  insulin lispro (ADMELOG) corrective regimen sliding scale   SubCutaneous at bedtime  insulin lispro Injectable (ADMELOG) 5 Unit(s) SubCutaneous three times a day before meals  lisinopril 5 milliGRAM(s) Oral daily    MEDICATIONS  (PRN):  acetaminophen     Tablet .. 650 milliGRAM(s) Oral every 6 hours PRN Temp greater or equal to 38C (100.4F), Mild Pain (1 - 3)  aluminum hydroxide/magnesium hydroxide/simethicone Suspension 30 milliLiter(s) Oral every 4 hours PRN Dyspepsia  dextrose Oral Gel 15 Gram(s) Oral once PRN Blood Glucose LESS THAN 70 milliGRAM(s)/deciliter  melatonin 3 milliGRAM(s) Oral at bedtime PRN Insomnia  ondansetron Injectable 4 milliGRAM(s) IV Push every 8 hours PRN Nausea and/or Vomiting      Allergies: No Known Allergies      Review of Systems:  Respiratory: No SOB, no cough  GI: No nausea, vomiting, abdominal pain  Endocrine: no polyuria, polydipsia      PHYSICAL EXAM:  VITALS: T(C): 36.8 (02-09-24 @ 13:46)  T(F): 98.2 (02-09-24 @ 13:46), Max: 98.5 (02-09-24 @ 05:55)  HR: 88 (02-09-24 @ 13:46) (73 - 88)  BP: 110/69 (02-09-24 @ 13:46) (110/69 - 129/89)  RR:  (16 - 18)  SpO2:  (99% - 100%)  Wt(kg): --  GENERAL: NAD, well-groomed, well-developed  RESPIRATORY: No labored breathing   GI: Soft, nontender, non distended  PSYCH: Alert and oriented x 3, normal affect, normal mood      CAPILLARY BLOOD GLUCOSE  POCT Blood Glucose.: 194 mg/dL (09 Feb 2024 17:53)  POCT Blood Glucose.: 139 mg/dL (09 Feb 2024 13:08)  POCT Blood Glucose.: 202 mg/dL (09 Feb 2024 09:35)  POCT Blood Glucose.: 273 mg/dL (08 Feb 2024 21:55)    A1C with Estimated Average Glucose (02.08.24 @ 05:52)    A1C with Estimated Average Glucose Result: 11.4   Estimated Average Glucose: 280      02-09    135  |  103  |  26<H>  ----------------------------<  246<H>  4.3   |  19<L>  |  1.18    eGFR: 74    Ca    9.1      02-09  Mg     2.10     02-09  Phos  3.8     02-09    TPro  7.5  /  Alb  4.1  /  TBili  0.5  /  DBili  x   /  AST  14  /  ALT  16  /  AlkPhos  51  02-08    Thyroid Function Tests:  02-08 @ 05:52 TSH 1.62 FreeT4 -- T3 -- Anti TPO -- Anti Thyroglobulin Ab -- TSI --    Diet, Regular:   Consistent Carbohydrate Evening Snack (CSTCHOSN)  DASH/TLC Sodium & Cholesterol Restricted (DASH) (02-08-24 @ 00:29) [Active]                        
SUBJECTIVE / OVERNIGHT EVENTS: No acute overnight events. Patient seen at bedside during AM rounds. Patient states that he just returned from getting his TTE done and feels well. Patient reports that he is still experiencing blurry vision in his right eye. Reports that he had right leg pain last night that felt like "needle pricks," Pain has since resolved. Denies chest pain, palpitations, dyspnea, dizziness, numbness, tingling, weakness, photophobia, eye pain, eye discharge, N/V/D/C, fever, chills.    ADDITIONAL REVIEW OF SYSTEMS: See above. Remainder of ROS negative.    MEDICATIONS  (STANDING):  amLODIPine   Tablet 5 milliGRAM(s) Oral daily  atorvastatin 20 milliGRAM(s) Oral at bedtime  dextrose 5%. 1000 milliLiter(s) (100 mL/Hr) IV Continuous <Continuous>  dextrose 5%. 1000 milliLiter(s) (50 mL/Hr) IV Continuous <Continuous>  dextrose 50% Injectable 12.5 Gram(s) IV Push once  dextrose 50% Injectable 25 Gram(s) IV Push once  dextrose 50% Injectable 25 Gram(s) IV Push once  gabapentin 100 milliGRAM(s) Oral at bedtime  glucagon  Injectable 1 milliGRAM(s) IntraMuscular once  insulin glargine Injectable (LANTUS) 14 Unit(s) SubCutaneous at bedtime  insulin lispro (ADMELOG) corrective regimen sliding scale   SubCutaneous three times a day before meals  insulin lispro (ADMELOG) corrective regimen sliding scale   SubCutaneous at bedtime  insulin lispro Injectable (ADMELOG) 4 Unit(s) SubCutaneous three times a day before meals  lisinopril 5 milliGRAM(s) Oral daily    MEDICATIONS  (PRN):  acetaminophen     Tablet .. 650 milliGRAM(s) Oral every 6 hours PRN Temp greater or equal to 38C (100.4F), Mild Pain (1 - 3)  aluminum hydroxide/magnesium hydroxide/simethicone Suspension 30 milliLiter(s) Oral every 4 hours PRN Dyspepsia  dextrose Oral Gel 15 Gram(s) Oral once PRN Blood Glucose LESS THAN 70 milliGRAM(s)/deciliter  melatonin 3 milliGRAM(s) Oral at bedtime PRN Insomnia  ondansetron Injectable 4 milliGRAM(s) IV Push every 8 hours PRN Nausea and/or Vomiting      I&O's Summary      PHYSICAL EXAM:  Vital Signs Last 24 Hrs  T(C): 36.9 (09 Feb 2024 05:55), Max: 37.2 (08 Feb 2024 14:51)  T(F): 98.5 (09 Feb 2024 05:55), Max: 99 (08 Feb 2024 14:51)  HR: 73 (09 Feb 2024 05:55) (72 - 81)  BP: 129/89 (09 Feb 2024 05:55) (117/76 - 129/89)  BP(mean): --  RR: 18 (09 Feb 2024 05:55) (16 - 18)  SpO2: 100% (09 Feb 2024 05:55) (99% - 100%)    Parameters below as of 09 Feb 2024 05:55  Patient On (Oxygen Delivery Method): room air      CONSTITUTIONAL: NAD, well-developed, well-groomed, sitting comfortably in bed.  ENT: PERRLA, EOMI, conjunctiva and sclera clear, trachea midline, neck supple.  RESPIRATORY: Lungs CTA B/L. Normal respiratory effort. No rales, wheezes, or rhonchi.  CARDIOVASCULAR: Regular rate and rhythm. Normal S1 and S2. No murmurs, rubs, or gallops. No lower extremity edema. Peripheral pulses are 2+ B/L.  ABDOMEN: Soft, nondistended, nontender to palpation. Normoactive bowel sounds x 4 quadrants. No rebound/guarding. No hepatosplenomegaly.  MUSCULOSKELETAL:  No clubbing or cyanosis of digits. No joint swelling. 5/5 strength in B/L UE and LE.  PSYCH: A+O to person, place, and time; affect appropriate  NEUROLOGY: CN 2-12 are intact and symmetric. No gross sensory deficits.  SKIN: No rashes, lesions, erythema, open wounds.    LABS:                        13.4   6.34  )-----------( 210      ( 09 Feb 2024 05:30 )             40.2     Hb Trend: 13.4<--, 13.2<--, 13.9<--  02-09    135  |  103  |  26<H>  ----------------------------<  246<H>  4.3   |  19<L>  |  1.18    Ca    9.1      09 Feb 2024 05:30  Phos  3.8     02-09  Mg     2.10     02-09    TPro  7.5  /  Alb  4.1  /  TBili  0.5  /  DBili  x   /  AST  14  /  ALT  16  /  AlkPhos  51  02-08    Creatinine Trend: 1.18<--, 1.07<--, 1.19<--        Urinalysis Basic - ( 09 Feb 2024 05:30 )    Color: x / Appearance: x / SG: x / pH: x  Gluc: 246 mg/dL / Ketone: x  / Bili: x / Urobili: x   Blood: x / Protein: x / Nitrite: x   Leuk Esterase: x / RBC: x / WBC x   Sq Epi: x / Non Sq Epi: x / Bacteria: x        Culture - Blood (collected 07 Feb 2024 21:57)  Source: .Blood Blood-Peripheral  Preliminary Report (09 Feb 2024 01:03):    No growth at 24 hours    Culture - Blood (collected 07 Feb 2024 21:57)  Source: .Blood Blood-Peripheral  Preliminary Report (09 Feb 2024 01:03):    No growth at 24 hours    Culture - Blood (collected 07 Feb 2024 21:57)  Source: .Blood Blood-Venous  Preliminary Report (09 Feb 2024 01:03):    No growth at 24 hours          RADIOLOGY & ADDITIONAL TESTS:    TTE:  CONCLUSIONS:      1. Left ventricular cavity is normal. Left ventricular wall thickness is normal. Left ventricular systolic function is normal with an ejection fraction of 69 % by York's method of disks. There are no regional wall motion abnormalities seen.   2. There is mild (grade 1) left ventricular diastolic dysfunction.   3. Normal right ventricular cavity size and normal systolic function.   4. Structurally normal mitral valve with normal leaflet excursion. There is trace mitral regurgitation.      COORDINATION OF CARE:  Consultant Communication and Details of Discussion (where applicable):    Endocrine:  #uncontrolled DM2 A1c improved from 15% to 11.4%  -start lantus 14 units nightly  -start admelog 4 units TID AC  -low admelog correction scales before meals and bedtime  -target glucose 100-180mg/dL  -carb consistent diet  -hypoglycemia protocol in place if needed  -RD consult

## 2024-02-09 NOTE — PROGRESS NOTE ADULT - ASSESSMENT
This is a 54 yo M /w A PMH of HTN and DM2 who presents with blurry vision.  Endocrinology consulted for evaluation of uncontrolled type 2 diabetes    #uncontrolled DM2 A1c improved from 15% to 11.4%  -Increase LAntus to 16 units sq qhs   -Increase Admelog to 5 units TID AC (please hold if npo/not eating)  -Continue Low Admelog correction scales before meals and low Admelog correction scale at bedtime  -Glucose Target 100-180mg/dL: above goal   -carb consistent diet  -hypoglycemia protocol in place if needed  -RD consult    #discharge-restart basal insulin with lantus/basaglar/semglee - dose to be determined prior to discharge  -c/w novolog on discharge - dose to be determined based on inpatient requirements  -c/w metformin 500mg BID with meals   Please send Lantus solostar pen and humalog kwikpen as test script to check insurance coverage.  Ok to send with current doses and update prior to d/c    If Lantus not covered- can try alternating with one of following   tresiba/basaglar/toujeo/Levemir    If Humalog not covered- can try alternating with one of following  novolog/apidra/admelog/fiasp  Ensure patient has working glucometer, test strips, lancets, alcohol pads, and BD guillaume pen needles  For severe hypoglycemia: Please prescribe Gvoke HypoPen One Pack 1mG/0.2mL subcutaneous solution: 1 mG subcutaneously as needed for severe hypoglycemia or Baqsimi One Pack 3mG nasal powder: 3 mg (one actuation) into a single nostril as needed for severe hypoglycemia. If not covered, please prescribe Glucagon Emergency Kit for Low Blood Sugar 1 mG injection: 1 mG intramuscularly as needed for severe hypoglycemia. Glucose tablets 4G (take 4 tablets) or 15G tablets for blood sugar less than 70 mG/dL repeat fingerstick in 15 minutes. Patient and family will need instructions on proper use and to call 911 after use.   -Please follow up with your pcp, endocrinology, podiatry, and opthalmology as an outpt   -Please follow up at Endocrine Practice at [address] [place] [address], [address], [place] [address]; Ph # [phone]: emailed office   -Spoke to Reed Aguilar [phone]    #HTN  -c/w amlodipine 5mg daily and lisinopril 5mg daily  -Please obtain urine micro albumin cr ratio as an outpt   -BP target <130/80    #HLD  - LDL goal <70  -c/w atorvastatin 20mg daily if no contraindications  -please obtain lipid profile as an outpt     Flora Cleary  Nurse Practitioner  Division of Endocrinology & Diabetes  In house pager #[phone]    If before 9AM or after 6PM, or on weekends/holidays, please call endocrine answering service for assistance ([phone]).For nonurgent matters email [email or web address] for assistance.   
This is a 53M with history of DM2 and HTN who presents to the hospital with complaints of intermittent chest pain and R eye blurry vision over the past 2-3 weeks. 
This is a 53M with history of DM2 and HTN who presents to the hospital with complaints of intermittent chest pain and R eye blurry vision over the past 2-3 weeks.

## 2024-02-09 NOTE — PROGRESS NOTE ADULT - REASON FOR ADMISSION
Intermittent chest pain, +R eye blurry vision

## 2024-02-09 NOTE — DIETITIAN INITIAL EVALUATION ADULT - NS FNS DIET ORDER
Diet, Regular:   Consistent Carbohydrate {Evening Snack} (CSTCHOSN)  DASH/TLC {Sodium & Cholesterol Restricted} (DASH) (02-08-24 @ 00:29) [Active]

## 2024-02-09 NOTE — PROGRESS NOTE ADULT - PROBLEM SELECTOR PLAN 2
- Blurry vision of the R eye x 2-3 weeks, noted to have ann spots by his ophthalmologist raising concern for possible infectious endocarditis though no other skin findings noted (janeway lesions, osler nodes)  - No murmur auscultated on examination  - Will monitor on telemetry  - check TTE  - BCx x3 collected in ED, pending  - Ophthalmology eval appreciated, CRP normal, ESR only mildly elevated to 18
- Blurry vision of the R eye x 2-3 weeks, noted to have ann spots by his ophthalmologist raising concern for possible infectious endocarditis though no other skin findings noted (janeway lesions, osler nodes)  - No murmur auscultated on examination  - Will monitor on telemetry  - TTE :Mild (grade 1) left ventricular diastolic dysfunction and trace mitral regurgitation.  - BCx x3 collected in ED, no growth in 24 hrs  - Ophthalmology eval appreciated, CRP normal, ESR only mildly elevated to 18

## 2024-02-09 NOTE — PROGRESS NOTE ADULT - PROBLEM SELECTOR PLAN 4
- Poorly controlled HTN in setting of medication non-compliance  - c/w home amlodipine and lisinopril  - Monitor BPs, readjust hold parameters as needed  - Patient without PCP -> will need referral to one on discharge
- Poorly controlled HTN in setting of medication non-compliance  - c/w home amlodipine and lisinopril  - Monitor BPs, readjust hold parameters as needed  - Patient without PCP -> will need referral to one on discharge

## 2024-02-09 NOTE — DIETITIAN INITIAL EVALUATION ADULT - REASON FOR ADMISSION
Chest pain    Patient is a 53y Male with PMH type 2 diabetes mellitus, HTN who presents to [facility] with complaints of intermittent chest pain and R eye blurry vision over the past 2-3 weeks.

## 2024-02-10 ENCOUNTER — TRANSCRIPTION ENCOUNTER (OUTPATIENT)
Age: 54
End: 2024-02-10

## 2024-02-10 VITALS
HEART RATE: 88 BPM | SYSTOLIC BLOOD PRESSURE: 142 MMHG | DIASTOLIC BLOOD PRESSURE: 87 MMHG | TEMPERATURE: 98 F | OXYGEN SATURATION: 100 % | RESPIRATION RATE: 17 BRPM

## 2024-02-10 LAB
ANION GAP SERPL CALC-SCNC: 12 MMOL/L — SIGNIFICANT CHANGE UP (ref 7–14)
BUN SERPL-MCNC: 28 MG/DL — HIGH (ref 7–23)
CALCIUM SERPL-MCNC: 9.5 MG/DL — SIGNIFICANT CHANGE UP (ref 8.4–10.5)
CHLORIDE SERPL-SCNC: 103 MMOL/L — SIGNIFICANT CHANGE UP (ref 98–107)
CO2 SERPL-SCNC: 21 MMOL/L — LOW (ref 22–31)
CREAT SERPL-MCNC: 1.15 MG/DL — SIGNIFICANT CHANGE UP (ref 0.5–1.3)
EGFR: 77 ML/MIN/1.73M2 — SIGNIFICANT CHANGE UP
GLUCOSE BLDC GLUCOMTR-MCNC: 159 MG/DL — HIGH (ref 70–99)
GLUCOSE BLDC GLUCOMTR-MCNC: 166 MG/DL — HIGH (ref 70–99)
GLUCOSE SERPL-MCNC: 193 MG/DL — HIGH (ref 70–99)
HCT VFR BLD CALC: 40.1 % — SIGNIFICANT CHANGE UP (ref 39–50)
HGB BLD-MCNC: 13.4 G/DL — SIGNIFICANT CHANGE UP (ref 13–17)
MAGNESIUM SERPL-MCNC: 1.9 MG/DL — SIGNIFICANT CHANGE UP (ref 1.6–2.6)
MCHC RBC-ENTMCNC: 28.3 PG — SIGNIFICANT CHANGE UP (ref 27–34)
MCHC RBC-ENTMCNC: 33.4 GM/DL — SIGNIFICANT CHANGE UP (ref 32–36)
MCV RBC AUTO: 84.8 FL — SIGNIFICANT CHANGE UP (ref 80–100)
NRBC # BLD: 0 /100 WBCS — SIGNIFICANT CHANGE UP (ref 0–0)
NRBC # FLD: 0 K/UL — SIGNIFICANT CHANGE UP (ref 0–0)
PHOSPHATE SERPL-MCNC: 4.9 MG/DL — HIGH (ref 2.5–4.5)
PLATELET # BLD AUTO: 211 K/UL — SIGNIFICANT CHANGE UP (ref 150–400)
POTASSIUM SERPL-MCNC: 4.3 MMOL/L — SIGNIFICANT CHANGE UP (ref 3.5–5.3)
POTASSIUM SERPL-SCNC: 4.3 MMOL/L — SIGNIFICANT CHANGE UP (ref 3.5–5.3)
RBC # BLD: 4.73 M/UL — SIGNIFICANT CHANGE UP (ref 4.2–5.8)
RBC # FLD: 12.4 % — SIGNIFICANT CHANGE UP (ref 10.3–14.5)
SODIUM SERPL-SCNC: 136 MMOL/L — SIGNIFICANT CHANGE UP (ref 135–145)
WBC # BLD: 7.03 K/UL — SIGNIFICANT CHANGE UP (ref 3.8–10.5)
WBC # FLD AUTO: 7.03 K/UL — SIGNIFICANT CHANGE UP (ref 3.8–10.5)

## 2024-02-10 PROCEDURE — 99239 HOSP IP/OBS DSCHRG MGMT >30: CPT

## 2024-02-10 RX ORDER — LISINOPRIL 2.5 MG/1
1 TABLET ORAL
Qty: 30 | Refills: 0
Start: 2024-02-10 | End: 2024-03-10

## 2024-02-10 RX ORDER — INSULIN GLARGINE 100 [IU]/ML
16 INJECTION, SOLUTION SUBCUTANEOUS
Qty: 1 | Refills: 0
Start: 2024-02-10 | End: 2024-03-10

## 2024-02-10 RX ORDER — ATORVASTATIN CALCIUM 80 MG/1
1 TABLET, FILM COATED ORAL
Qty: 30 | Refills: 0
Start: 2024-02-10 | End: 2024-03-10

## 2024-02-10 RX ORDER — LISINOPRIL 2.5 MG/1
5 TABLET ORAL ONCE
Refills: 0 | Status: COMPLETED | OUTPATIENT
Start: 2024-02-10 | End: 2024-02-10

## 2024-02-10 RX ORDER — INSULIN LISPRO 100/ML
5 VIAL (ML) SUBCUTANEOUS
Qty: 1 | Refills: 0
Start: 2024-02-10 | End: 2024-03-10

## 2024-02-10 RX ORDER — ISOPROPYL ALCOHOL, BENZOCAINE .7; .06 ML/ML; ML/ML
0 SWAB TOPICAL
Qty: 100 | Refills: 1
Start: 2024-02-10

## 2024-02-10 RX ORDER — AMLODIPINE BESYLATE 2.5 MG/1
1 TABLET ORAL
Qty: 30 | Refills: 0
Start: 2024-02-10 | End: 2024-03-10

## 2024-02-10 RX ADMIN — LISINOPRIL 5 MILLIGRAM(S): 2.5 TABLET ORAL at 00:24

## 2024-02-10 RX ADMIN — Medication 5 UNIT(S): at 08:59

## 2024-02-10 RX ADMIN — AMLODIPINE BESYLATE 5 MILLIGRAM(S): 2.5 TABLET ORAL at 12:37

## 2024-02-10 RX ADMIN — Medication 1: at 08:59

## 2024-02-10 NOTE — DISCHARGE NOTE PROVIDER - CARE PROVIDER_API CALL
Dirk Luis  Ophthalmology  [address] [place] [id number]  Phone: [phone]  Fax: [phone]  Established Patient  Follow Up Time: 2 weeks

## 2024-02-10 NOTE — DISCHARGE NOTE NURSING/CASE MANAGEMENT/SOCIAL WORK - PATIENT PORTAL LINK FT
You can access the FollowMyHealth Patient Portal offered by [facility] by registering at the following website: [email or web address]. By joining [facility]’s FollowMyHealth portal, you will also be able to view your health information using other applications (apps) compatible with our system.

## 2024-02-10 NOTE — DISCHARGE NOTE PROVIDER - ATTENDING DISCHARGE PHYSICAL EXAMINATION:
negative...
Patient seen and examined at bedside on day of discharge (2/10/24). Patient alert and conversant. Reports that he generally feels well. Denies any nausea or vomiting. Denies any further episodes of CP. Denies any fever, dizziness or SOB. States that he feels as though his vision is improving. Tolerating diet and ambulating without issue. VSS, remaining afebrile throughout admission. Exam largely unremarkable. No murmurs noted on exam. BCx from admission with no growth at 48 hours. TTE grossly normal. Diabetes regimen adjusted per endocrinology. Patient otherwise medically optimized for discharge home with PCP, endocrine and opthalmology follow up.

## 2024-02-10 NOTE — DISCHARGE NOTE NURSING/CASE MANAGEMENT/SOCIAL WORK - NSDCFUADDAPPT_GEN_ALL_CORE_FT
Please follow up with your ophthalmologist for follow up eye exams.    You may establish care with a primary care doctor at [facility] [place]. You can call [phone] to schedule an appointment with any available provider.    Please follow up with the endocrinology clinic for further management of your diabetes. Please call [phone] to schedule an appointment.

## 2024-02-10 NOTE — DISCHARGE NOTE PROVIDER - NSDCCPTREATMENT_GEN_ALL_CORE_FT
PRINCIPAL PROCEDURE  Procedure: Transthoracic echocardiogram  Findings and Treatment: CONCLUSIONS:      1. Left ventricular cavity is normal. Left ventricular wall thickness is normal. Left ventricular systolic function is normal with an ejection fraction of 69 % by York's method of disks. There are no regional wallmotion abnormalities seen.   2. There is mild (grade 1) left ventricular diastolic dysfunction.   3. Normal right ventricular cavity size and normal systolic function.   4. Structurally normal mitral valve with normal leaflet excursion. There is trace mitral regurgitation.< from: TTE W or WO Ultrasound Enhancing Agent   (02.09.24 @ 10:33)

## 2024-02-10 NOTE — DISCHARGE NOTE NURSING/CASE MANAGEMENT/SOCIAL WORK - NSDCPEFALRISK_GEN_ALL_CORE
For information on Fall & Injury Prevention, visit: [email or web address] OR  [email or web address] OR  https://www.cdc.gov/steadi/patient.html

## 2024-02-10 NOTE — DISCHARGE NOTE PROVIDER - NSDCFUADDAPPT_GEN_ALL_CORE_FT
Please follow up with your ophthalmologist for follow up eye exams.    You may establish care with a primary care doctor at [facility] [place]. You can call [phone] to schedule an appointment with any available provider.    Please follow up with the endocrinology clinic for further management of your diabetes. Please call [phone] to schedule an appointment.

## 2024-02-10 NOTE — DISCHARGE NOTE PROVIDER - NSFOLLOWUPCLINICS_GEN_ALL_ED_FT
[facility] Endocrinology  Endocrinology  [address] [place] [address]  Phone: [phone]  Fax:   Follow Up Time: 1 month    [facility] [place]  Internal Medicine  [address] [place] [address]  Phone: [phone]  Fax: [phone]  Follow Up Time: 2 weeks

## 2024-02-10 NOTE — DISCHARGE NOTE PROVIDER - NSDCCPCAREPLAN_GEN_ALL_CORE_FT
PRINCIPAL DISCHARGE DIAGNOSIS  Diagnosis: Blurry vision, right eye  Assessment and Plan of Treatment: You were found to have Mandujano spots in your eye by your eye doctor. These spots can sometimes occur in a condition called infective endocarditis, which is an infection in the heart. We performed several tests including tests to see if there is bacteria in your blood as well as an ultrasound of your heart. Overall our tests as well as your overall lack of clinical symptoms and signs suggest that you do not have this heart infection. Most likely the Mandujano spots that you have in your eyes are more likely to be due to your diabetes. Please follow up with your eye doctor as well as a primary care doctor for further monitoring. If you experience fevers, chest pain, shortness of breath or palpitations, please return to the hospital.      SECONDARY DISCHARGE DIAGNOSES  Diagnosis: Type 2 diabetes mellitus  Assessment and Plan of Treatment: In the hospital we noted that your blood sugars were high and your A1C which is a marker of your diabetes control remained elevated. Our diabetes specialists evaluated you and made some adjustments and recommendations regarding the best medication regimen to help control your diabetes. Please continue to take your insulin and diabetes medications as directed and check your blood sugar regularly. Please follow up with the endocrinologists at the [facility].    Diagnosis: Intermittent chest pain  Assessment and Plan of Treatment: You were also evaluated for intermittent chest pain. We performed cardiac testing, including an EKG and blood tests for cardiac enzymes. These tests were normal and your echocardiogram was also normal. Please follow up with your primary care doctor.

## 2024-02-10 NOTE — DISCHARGE NOTE PROVIDER - NSDCMRMEDTOKEN_GEN_ALL_CORE_FT
Admelog SoloStar 100 units/mL injectable solution: 4 unit(s) injectable 3 times a day (before meals)  gabapentin 100 mg oral capsule: 1 cap(s) orally   DISPLAY PLAN FREE TEXT 4 glucose tablets or 15: Follow instructions on bottle when sugar is low &lt; 70  alcohol swabs: Apply topically to affected area 4 times a day  amLODIPine 5 mg oral tablet: 1 tab(s) orally once a day  atorvastatin 20 mg oral tablet: 1 tab(s) orally once a day (at bedtime)  gabapentin 100 mg oral capsule: 1 cap(s) orally  glucometer (per patient&#x27;s insurance): Test blood sugars four times a day. Dispense #1 glucometer.  HumaLOG KwikPen 100 units/mL injectable solution: 5 unit(s) subcutaneous 3 times a day (before meals)  Insulin Pen Needles, 4mm: 1 application subcutaneously 4 times a day. ** Use with insulin pen **  lancets: 1 application subcutaneously 4 times a day  Lantus Solostar Pen 100 units/mL subcutaneous solution: 16 unit(s) subcutaneous once a day (at bedtime)  lisinopril 5 mg oral tablet: 1 tab(s) orally once a day  test strips (per patient&#x27;s insurance): 1 application subcutaneously 4 times a day. ** Compatible with patient&#x27;s glucometer **

## 2024-02-10 NOTE — DISCHARGE NOTE PROVIDER - CARE PROVIDERS DIRECT ADDRESSES
[email or web address]

## 2024-02-10 NOTE — DISCHARGE NOTE PROVIDER - HOSPITAL COURSE
Patient is a 52 y/o M with history of DM2 and HTN who presents to the hospital with complaints of intermittent chest pain and R eye blurry vision over for 2-3 weeks. Patient was evaluated by his outpatient ophthalmologist and noted to have ann spots on eye exam and sent in to the hospital for workup for possible infective endocarditis. Patient admitted to medicine service for further workup.    Patient underwent cardiac workup with negative trops and EKG which was nonischemic. He underwent a TTE which showed normal EF, no WMA and no valvular abnormalities. Patient had 3 sets of blood cultures obtained on admission which were negative for any growth at 48 hours. Patient remained afebrile throughout his admission and did not show any gross evidence of possible endocarditis. His physical exam did not reveal any murmurs or focal neurologic deficits. Patient without any skin lesions or rashes and did not have any acute joint pains. Case briefly discussed with ID team and literature reviewed. Patient's ann spots more likely to be due to capillary rupture from diabetic retinopathy. Patient noted with elevated A1C of 11.4% and endocrinology evaluated the patient and adjusted his medication regimen.

## 2024-02-13 LAB
CULTURE RESULTS: SIGNIFICANT CHANGE UP
SPECIMEN SOURCE: SIGNIFICANT CHANGE UP

## 2024-03-13 ENCOUNTER — APPOINTMENT (OUTPATIENT)
Dept: ENDOCRINOLOGY | Facility: CLINIC | Age: 54
End: 2024-03-13

## 2024-03-29 ENCOUNTER — APPOINTMENT (OUTPATIENT)
Dept: ENDOCRINOLOGY | Facility: CLINIC | Age: 54
End: 2024-03-29
Payer: COMMERCIAL

## 2024-03-29 VITALS
BODY MASS INDEX: 25.48 KG/M2 | SYSTOLIC BLOOD PRESSURE: 160 MMHG | WEIGHT: 172 LBS | HEART RATE: 86 BPM | DIASTOLIC BLOOD PRESSURE: 100 MMHG | OXYGEN SATURATION: 98 % | HEIGHT: 69 IN

## 2024-03-29 DIAGNOSIS — I10 ESSENTIAL (PRIMARY) HYPERTENSION: ICD-10-CM

## 2024-03-29 DIAGNOSIS — E11.9 TYPE 2 DIABETES MELLITUS W/OUT COMPLICATIONS: ICD-10-CM

## 2024-03-29 DIAGNOSIS — E78.5 HYPERLIPIDEMIA, UNSPECIFIED: ICD-10-CM

## 2024-03-29 PROCEDURE — G2211 COMPLEX E/M VISIT ADD ON: CPT | Mod: NC,1L

## 2024-03-29 PROCEDURE — 99215 OFFICE O/P EST HI 40 MIN: CPT

## 2024-03-29 RX ORDER — INSULIN GLARGINE 100 [IU]/ML
100 INJECTION, SOLUTION SUBCUTANEOUS
Qty: 1 | Refills: 1 | Status: ACTIVE | COMMUNITY
Start: 2024-03-29 | End: 1900-01-01

## 2024-03-29 RX ORDER — INSULIN LISPRO 100 [IU]/ML
100 INJECTION, SOLUTION INTRAVENOUS; SUBCUTANEOUS
Qty: 1 | Refills: 1 | Status: ACTIVE | COMMUNITY
Start: 2024-03-29 | End: 1900-01-01

## 2024-05-08 NOTE — PHYSICAL EXAM
[Alert] : alert [Normal Sclera/Conjunctiva] : normal sclera/conjunctiva [No Acute Distress] : no acute distress [No Proptosis] : no proptosis [Thyroid Not Enlarged] : the thyroid was not enlarged [No Respiratory Distress] : no respiratory distress [No Accessory Muscle Use] : no accessory muscle use [Normal S1, S2] : normal S1 and S2 [Clear to Auscultation] : lungs were clear to auscultation bilaterally [Regular Rhythm] : with a regular rhythm [Normal Rate] : heart rate was normal [Normal Bowel Sounds] : normal bowel sounds [No Edema] : no peripheral edema [Soft] : abdomen soft [Not Tender] : non-tender [No Spinal Tenderness] : no spinal tenderness [No Stigmata of Cushings Syndrome] : no stigmata of Cushings Syndrome [Normal Gait] : normal gait [No Rash] : no rash [Left foot was examined, including] : left foot ~C was examined, including visual inspection with sensory and pulse exams [Right foot was examined, including] : right foot ~C was examined, including visual inspection with sensory and pulse exams [2+] : 2+ in the dorsalis pedis [Normal] : normal [No Tremors] : no tremors [Normal Reflexes] : deep tendon reflexes were 2+ and symmetric [Oriented x3] : oriented to person, place, and time [Normal Insight/Judgement] : insight and judgment were intact [Diminished Throughout Both Feet] : normal tactile sensation with monofilament testing throughout both feet [de-identified] : MFT done today 3/29/2024

## 2024-05-08 NOTE — HISTORY OF PRESENT ILLNESS
[FreeTextEntry1] : 54 yo M with PMH of HTN, HLD, Ann spots in eyes, here for hospital follow-up of Type 2 Diabetes.  Hospital Course (2024-2/10/2024): This is 53M with history of HTN and DM2 who presents to the hospital with complaints of intermittent chest pain and R eye blurry vision for the past 2-3 weeks. Endocrinology consulted for evaluation of uncontrolled type 2 diabetes. Said that the blurry vision is still present and notes light sensitivity with it but no eye pain or discharge. Went to see an ophthalmologist last week (Dr. Dirk Luis) who noted the patient had ann spots and recommended he come to the hospital for evaluation. Patient eventually came to [facility] for evaluation. He also reports having numbness of the L lateral foot L fingertips but not the R side. Said that he has had this numbness for about 4 months. The medication his son sent from [facility] was gabapentin. Patient also reports intermittent compliance with his medications. Was in [facility] in 2023 for blurry vision and at that time was noted to have uncontrolled DM2 with A1C of 15.1, was placed in the CDU and evaluated by endocrine, was discharged with scripts for novolog 4 units TID AC, lantus 14 units nightly, metformin 500mg BID, and amlodipine. Patient reports only taking the novolog 4 units before meals and nothing else. Patient states that he ran out of the BP medication and is currently only taking the pre-meal insulin. Spoke with his niece Deena ([phone]) who said that the patient does not have a PCP and has not received refills for any of his medications. He is intermittently compliant with his medications and therefore still has some of his medications available. On arrival to the ED his vitals were T 98, P 94, /118, RR 16, O2 sat 100% RA. His lab work did not show significant abnormalities and his troponin was low at 9. He was evaluated by ophthalmology who noted patient with signs of diabetic retinopathy b/l and ann spots in the R eye superiorly.  DISCHARGE DM MEDS: Lantus 16 units sq qhs, Admelog 5 units TID AC (please hold if npo/not eating), c/w metformin 500mg BID with meals.   CURRENT HPI: Interval History: Reports he ran out of his BP and DM meds about 2 weeks ago. He is stressed right now due to sons being hospitalized. Also has trouble sleeping.  Diagnosis: around 2018 or 2019 (diagnosed by PCP in [facility]) Previous Endo: none, was managed by PCP  Adherence: see below  A1c: 11.4% (2024) <-- 15.1% (11/10/2023).  Pertinent labs: TSH 1.82 (11/10/2023).   Current DM Medications: -Lantus 16 units qhs (mostly adherent, sometimes skips Lantus 1-2x/week if bedtime BG is "normal" around 120-130) -Humalog 4 units TIDAC (takes about 5 minutes before meals, sometimes skips breakfast dose 1-2x/week if fasting BG is "normal" around 120-130) -not using metformin since discharge (only if needed for higher BG)  Past DM medications:  -metformin 500 mg TID (used to feel palpitations while on metformin for a few years, no palpitations since stopping metformin)  Complications: Denies CAD or CVA. Denies h/o DKA. Had been to hospital in [facility] for hyperglycemia. Unclear possible h/o DKA in [facility]?  Eyes: Last ophthalmology visit 3/2024 in [place], will bring reports next visit. Pending cataract surgery next 2024. +Diabetic retinopathy bilaterally, getting injections for both eyes. +Improved blurry vision. Feet: Has not seen podiatry before. +H/o burning of RLE when in [facility], better now, off gabapentin. Denies h/o foot ulcers or sores.  Nephrology: eGFR 77 (2/10/2024). Denies h/o nephropathy. Denies h/o UTIs or genital mycotic infections. +Improved polyuria. Denies polydipsia. Weight Change: baseline wt was 160-170 lbs in [facility], [place] got to [place] by [place] down to 145 lbs, now back to 172 lbs and did not notice weight gain.  SMBG (Ikza): checks 1x/day fasting fasting- 134 (no lantus), 218, 177, 174, 98, 171, 134, 145 (reports usually 120s-130s, sometimes 170s, lowest was 74).  pre-lunch- 160-170 Glucometer reviewed: time on meter is wrong 5 hours post breakfast- 219 ( at 7am = 3/29 at 1230pm) 175 1:30am 231 10am 168 3am 215 3am 218 3am 177 3am 98 3am 171 3am 134 3am 145 3am 181 3am 161 3am 325 6am 174 3am 187 5am 191 3am 316 12am 187 5am 250 3am  Hypoglycemia: Denies hypoglycemia, lowest was 74. Denies hypo symptoms. Knows how to treat hypos.  Current Diet: 2-3 meals a day breakfast- garfield with ketchup; 1 roti, sometimes bread, sometimes some soup lunch- spinach, fistful of white rice, little provisions (chicken, fried chicken) dinner- soda crackers, cup of coffee with some milk and honey with crackers, sometimes piece of roti with parikh  snacks- bigfoot cheese puffs (after lunch) drinks- water, no soda or juice Exercise: not much, watches [employer]/does chores    PMH: T2DM, HTN, HLD, Ann spots in eyes, LVEF 69%/grade 1 LV diastolic dysfunction, trace mitral regurgitation Surgical Hx: none Social Hx: never a smoker, used to drink alcohol socially  FHx: 2 brothers (passed from MI at age 63, 1 brother with DM), mother (DM, had diabetic coma once), dad ( from HTN at 67),  Current Meds: DM meds above, atorvastatin 20 mg daily, amlodipine 5 mg daily, lisinopril 5 mg daily Supplements: none 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 Angelica Girard

## 2024-05-09 RX ORDER — BLOOD-GLUCOSE,RECEIVER,CONT
EACH MISCELLANEOUS
Qty: 1 | Refills: 0 | Status: ACTIVE | COMMUNITY
Start: 2024-05-09 | End: 1900-01-01

## 2024-05-09 RX ORDER — BLOOD-GLUCOSE SENSOR
EACH MISCELLANEOUS
Qty: 6 | Refills: 3 | Status: ACTIVE | COMMUNITY
Start: 2024-05-09 | End: 1900-01-01

## 2024-06-06 LAB
ALBUMIN SERPL ELPH-MCNC: 4.8 G/DL
ALP BLD-CCNC: 65 U/L
ALT SERPL-CCNC: 25 U/L
ANION GAP SERPL CALC-SCNC: 11 MMOL/L
AST SERPL-CCNC: 15 U/L
BILIRUB SERPL-MCNC: 0.4 MG/DL
BUN SERPL-MCNC: 16 MG/DL
C PEPTIDE SERPL-MCNC: 3.4 NG/ML
CALCIUM SERPL-MCNC: 9.9 MG/DL
CHLORIDE SERPL-SCNC: 101 MMOL/L
CHOLEST SERPL-MCNC: 156 MG/DL
CO2 SERPL-SCNC: 25 MMOL/L
CREAT SERPL-MCNC: 1.24 MG/DL
CREAT SPEC-SCNC: 128 MG/DL
EGFR: 70 ML/MIN/1.73M2
GAD65 AB SER-MCNC: 0 NMOL/L
GLUCOSE SERPL-MCNC: 193 MG/DL
HDLC SERPL-MCNC: 28 MG/DL
ISLET CELL512 AB SER-SCNC: 0 NMOL/L
LDLC SERPL CALC-MCNC: 75 MG/DL
MICROALBUMIN 24H UR DL<=1MG/L-MCNC: 2.8 MG/DL
MICROALBUMIN/CREAT 24H UR-RTO: 22 MG/G
NONHDLC SERPL-MCNC: 128 MG/DL
PANC ISLET CELL AB SER QL: NORMAL
POTASSIUM SERPL-SCNC: 4.8 MMOL/L
PROT SERPL-MCNC: 8.3 G/DL
SODIUM SERPL-SCNC: 137 MMOL/L
TRIGL SERPL-MCNC: 327 MG/DL
ZINC TRANSPORTER 8 AB: <15 U/ML

## 2024-06-21 RX ORDER — AMLODIPINE BESYLATE 5 MG/1
5 TABLET ORAL
Qty: 90 | Refills: 0 | Status: ACTIVE | COMMUNITY
Start: 2024-03-29 | End: 1900-01-01

## 2024-06-21 RX ORDER — PEN NEEDLE, DIABETIC 29 G X1/2"
32G X 4 MM NEEDLE, DISPOSABLE MISCELLANEOUS
Qty: 4 | Refills: 1 | Status: ACTIVE | COMMUNITY
Start: 2024-03-29 | End: 1900-01-01

## 2024-06-21 RX ORDER — LISINOPRIL 5 MG/1
5 TABLET ORAL DAILY
Qty: 90 | Refills: 0 | Status: ACTIVE | COMMUNITY
Start: 2024-03-29 | End: 1900-01-01

## 2024-06-21 RX ORDER — ATORVASTATIN CALCIUM 20 MG/1
20 TABLET, FILM COATED ORAL
Qty: 90 | Refills: 0 | Status: ACTIVE | COMMUNITY
Start: 2024-03-29 | End: 1900-01-01

## 2024-09-06 ENCOUNTER — APPOINTMENT (OUTPATIENT)
Dept: ENDOCRINOLOGY | Facility: CLINIC | Age: 54
End: 2024-09-06
Payer: COMMERCIAL

## 2024-09-06 VITALS
DIASTOLIC BLOOD PRESSURE: 98 MMHG | SYSTOLIC BLOOD PRESSURE: 142 MMHG | HEART RATE: 86 BPM | HEIGHT: 69 IN | OXYGEN SATURATION: 99 % | WEIGHT: 165 LBS | BODY MASS INDEX: 24.44 KG/M2

## 2024-09-06 DIAGNOSIS — E11.9 TYPE 2 DIABETES MELLITUS W/OUT COMPLICATIONS: ICD-10-CM

## 2024-09-06 DIAGNOSIS — I10 ESSENTIAL (PRIMARY) HYPERTENSION: ICD-10-CM

## 2024-09-06 DIAGNOSIS — E78.5 HYPERLIPIDEMIA, UNSPECIFIED: ICD-10-CM

## 2024-09-06 LAB — HBA1C MFR BLD HPLC: 12.4

## 2024-09-06 PROCEDURE — 99215 OFFICE O/P EST HI 40 MIN: CPT

## 2024-09-06 PROCEDURE — 83036 HEMOGLOBIN GLYCOSYLATED A1C: CPT | Mod: QW

## 2024-09-06 RX ORDER — DAPAGLIFLOZIN 5 MG/1
5 TABLET, FILM COATED ORAL
Qty: 90 | Refills: 2 | Status: ACTIVE | COMMUNITY
Start: 2024-09-06 | End: 1900-01-01

## 2025-03-20 ENCOUNTER — APPOINTMENT (OUTPATIENT)
Dept: ENDOCRINOLOGY | Facility: CLINIC | Age: 55
End: 2025-03-20
Payer: SELF-PAY

## 2025-03-20 VITALS
DIASTOLIC BLOOD PRESSURE: 80 MMHG | WEIGHT: 167 LBS | BODY MASS INDEX: 24.73 KG/M2 | HEART RATE: 96 BPM | SYSTOLIC BLOOD PRESSURE: 110 MMHG | OXYGEN SATURATION: 99 % | HEIGHT: 69 IN

## 2025-03-20 DIAGNOSIS — I10 ESSENTIAL (PRIMARY) HYPERTENSION: ICD-10-CM

## 2025-03-20 DIAGNOSIS — E11.9 TYPE 2 DIABETES MELLITUS W/OUT COMPLICATIONS: ICD-10-CM

## 2025-03-20 DIAGNOSIS — E78.5 HYPERLIPIDEMIA, UNSPECIFIED: ICD-10-CM

## 2025-03-20 LAB — HBA1C MFR BLD HPLC: 13.2

## 2025-03-20 PROCEDURE — G2211 COMPLEX E/M VISIT ADD ON: CPT

## 2025-03-20 PROCEDURE — 83036 HEMOGLOBIN GLYCOSYLATED A1C: CPT | Mod: QW

## 2025-03-20 PROCEDURE — 99214 OFFICE O/P EST MOD 30 MIN: CPT

## 2025-04-15 NOTE — ASSESSMENT
ASSESSMENT:  1. Acute sinusitis, recurrence not specified, unspecified location      PLAN:  Zpajosh was prescribed   Supportive care reviewed   Follow up prn   ---------------------------------------------------------------------------------------------------------------  Chief Complaint   Patient presents with    Sinus Problem     Sinus issues w/ mom        HISTORY OF PRESENT ILLNESS:    Jennifer Prince is a 18 year old.female who presents with mom  Sick 5-6 days   Started with runny nose and slight cough  Then UAC and runny nose   Dyspnea   Worsened     Rhinorrhea:  clear to yellow   Cough:  Dry, daytime, no gag/vomit  Earache: Denied  Sore throat: at beginning some   Headache: yes, constant - top and sides   Stomach ache: denied  Vomiting: Denied  Diarrhea:  Denied  Appetite: decreased  Fever: 99 at school yesterday  Rash:  Denied  Sleep:  more/less/restless all at the same time  Energy: decreased  Known Sick Exposures:  friend sick,      Allergies, medications and problem list reviewed.   Rest of 11 point review of systems was negative.      PHYSICAL EXAMINATION:  Visit Vitals  Temp 98 °F (36.7 °C) (Temporal)   Wt 79.5 kg (175 lb 4.8 oz)   LMP 10/14/2024 (Exact Date)     GENERAL:   Jennifer Prince is alert, well-nourished appearing and in no distress.  Non-toxic appearing.  SKIN:   Warm and well perfused.  HEAD:  Atraumatic and normocephalic  EYES:  Normal lids, sclerae, conjunctivae bilaterally. Gaze is conjugate.  Pupils equal, round, reactive to light and accommodation, extraocular movements intact.  EARS:  Normal pinnae and canals bilaterally.  Ears are normal set.  left tympanic membrane:  normal   right  tympanic membrane: normal  NOSE:  Septum midline and turbinates edematous .   THROAT:  Mouth, tongue and lips are normal.    Palate:  Normal     Posterior Pharynx: purulent drainage and cobblestoning   Tonsils normal  NECK: Supple, without cervical or supraclavicular lymphadenopathy.  HEART:   [Long Term Vascular Complications] : long term vascular complications of diabetes Regular rate and rhythm, no murmur.  LUNGS:  Clear to auscultation.  No rales/rhonchi/wheezes. No accessory muscle usage.   NEUROLOGICAL:  alert and interactive  Speech age appropriate, gait normal.   ______________________     [Diabetes Foot Care] : diabetes foot care [Carbohydrate Consistent Diet] : carbohydrate consistent diet [Exercise/Effect on Glucose] : exercise/effect on glucose [Importance of Diet and Exercise] : importance of diet and exercise to improve glycemic control, achieve weight loss and improve cardiovascular health [Hypoglycemia Management] : hypoglycemia management [Self Monitoring of Blood Glucose] : self monitoring of blood glucose [Retinopathy Screening] : Patient was referred to ophthalmology for retinopathy screening [FreeTextEntry1] : 54 yo M with PMH of HTN, HLD, Mandujano spots in eyes, diabetic retinopathy, here for hospital follow-up of Type 2 Diabetes.  Type 2 Diabetes, uncontrolled: - A1c 11.4% (2/8/2024) <-- 15.1% (11/10/2023).  - Glucose data reviewed today. - Continue Lantus 16 units qhs. Discussed increased adherence. - Increase Humalog to 5 units qac. Discussed increased adherence. - Had palpitations with metformin. - Discussed hypoglycemia management.  - Discussed diet modification, carb consistent diet, and exercise.  - Continue SMBG 2-3x daily (keep glucose log/bring meter to all visits and send readings to us). Call if persistent highs or lows. Will try to order CGM given patient is on MDI.  - F/u with ophthalmology and podiatry annually. UTD with ophtho. - Questionable h/o DKA in [facility]? Can check c-peptide and pancreatic antibodies.   HTN: - Goal BP <130/80, /100 today, will continue to monitor. Advised to check home BP. - He ran out of BP meds, refilled amlodipine 5 mg daily and lisinopril 5 mg daily. Continue management as per PCP. - eGFR 77 (2/10/2024). Check urine alb/cr.  HLD: - Goal LDL <70. , HDL 29,  (2/8/2024). - On atorvastatin 20 mg daily, refilled. - Check lipid panel.  F/u in 3 months with Dr. Flood.  Shanda Stanford NP (Brenda)

## 2025-06-19 ENCOUNTER — APPOINTMENT (OUTPATIENT)
Dept: ENDOCRINOLOGY | Facility: CLINIC | Age: 55
End: 2025-06-19

## 2025-07-25 NOTE — ED CDU PROVIDER INITIAL DAY NOTE - INDICATION FOR OBSERVATION
Emergency Department Encounter        Pt Name: Ariel Castillo  MRN: [id number]  Birthdate 1953  Date of evaluation: 7/25/2025  ED Physician: Eliu Echevarria MD    CHIEF COMPLAINT     Triage Chief Complaint:   Chest Pain (C/o chest pain since Monday. Pt first thought it was after doing some \"push ups\" but reports it has not gotten any better. Pt reports he called his PCP office and they referred him to come to ED)      HISTORY OF PRESENT ILLNESS & REVIEW OF SYSTEMS     History obtained from the patient and staff and spouse at bedside.    Ariel Castillo is a 71 y.o. male who presents to the emergency department for evaluation of chest pain.  He states he began having chest pain on Monday, says it is constant muscle soreness, says that has intermittent fluctuations of worsening, mostly with movement.  Says that started after he did some push-ups which he thinks is likely exacerbating factor.  Reports he did have a cath like when he was 41 but it was for his mitral valve, did not get stents.  Says he tried some Tylenol without resolution.  Denies worsening with exertion.  Denies pressure sensation.  Denies any other issues or concerns.        Patient denies any new Headache, Fever, Chills, Cough, Shortness of breath, Abdominal pain, Nausea, Vomiting, Diarrhea, Constipation, and Leg swelling.    The patient has no other acute complaints at this time.  Review of systems as above.          PAST MED/SURG/SOCIAL/FAM HISTORY & ALLERGY & MEDICATIONS     Past Medical History:   Diagnosis Date    Allergic rhinitis     chemical    BPH (benign prostatic hyperplasia)     Chronic kidney disease, stage 2 (mild)     Essential hypertension     Gout     Hyperlipidemia     Left carotid artery stenosis     mild plaque build up    Microscopic hematuria     MVP (mitral valve prolapse)     Primary erectile dysfunction      Patient Active Problem List   Diagnosis Code    Hyperlipidemia E78.5    Elevated LFTs R79.89    Non-traumatic  (10/10/2024)    Exercise Vital Sign     Days of Exercise per Week: 7 days     Minutes of Exercise per Session: 150+ min   Stress: Not on file   Social Connections: Not on file   Intimate Partner Violence: Not on file   Housing Stability: Unknown (5/24/2024)    Housing Stability Vital Sign     Unable to Pay for Housing in the Last Year: Not on file     Number of Places Lived in the Last Year: Not on file     Unstable Housing in the Last Year: No     Current Facility-Administered Medications   Medication Dose Route Frequency Provider Last Rate Last Admin    lidocaine 4 % external patch 1 patch  1 patch TransDERmal Once Eliu Echevarria MD   1 patch at 07/25/25 1441     Current Outpatient Medications   Medication Sig Dispense Refill    lidocaine (LIDODERM) 5 % Place 1 patch onto the skin daily for 10 days 12 hours on, 12 hours off. 10 patch 0    gabapentin (NEURONTIN) 100 MG capsule Take 1 capsule by mouth 2 times daily for 5 days. 10 capsule 0    amLODIPine-benazepril (LOTREL) 10-40 MG per capsule Take 1 capsule by mouth daily 90 capsule 3    spironolactone (ALDACTONE) 25 MG tablet Take 1 tablet by mouth daily 90 tablet 3    allopurinol (ZYLOPRIM) 300 MG tablet Take 1 tablet by mouth daily 90 tablet 1    sildenafil (VIAGRA) 50 MG tablet Take 1 tablet by mouth daily as needed for Erectile Dysfunction 30 tablet 1    acetaminophen (TYLENOL) 500 MG tablet Take 1 tablet by mouth daily as needed for Pain           Nursing Notes Reviewed        PHYSICAL EXAM     ED Triage Vitals [07/25/25 1331]   BP Systolic BP Percentile Diastolic BP Percentile Temp Temp Source Pulse Respirations SpO2   -- -- -- 97.8 °F (36.6 °C) Oral 65 16 98 %      Height Weight - Scale         1.753 m (5' 9\") 78 kg (172 lb)           Triage VS:    ED Triage Vitals [07/25/25 1331]   Encounter Vitals Group      BP       Systolic BP Percentile       Diastolic BP Percentile       Pulse 65      Respirations 16      Temp 97.8 °F (36.6 °C)      Temp Source  Therapeutic Intensity

## 2025-09-18 ENCOUNTER — APPOINTMENT (OUTPATIENT)
Dept: ENDOCRINOLOGY | Facility: CLINIC | Age: 55
End: 2025-09-18